# Patient Record
Sex: FEMALE | Race: WHITE | NOT HISPANIC OR LATINO | Employment: OTHER | URBAN - METROPOLITAN AREA
[De-identification: names, ages, dates, MRNs, and addresses within clinical notes are randomized per-mention and may not be internally consistent; named-entity substitution may affect disease eponyms.]

---

## 2017-10-11 ENCOUNTER — TRANSFERRED RECORDS (OUTPATIENT)
Dept: HEALTH INFORMATION MANAGEMENT | Facility: CLINIC | Age: 72
End: 2017-10-11

## 2020-11-17 ENCOUNTER — TRANSFERRED RECORDS (OUTPATIENT)
Dept: HEALTH INFORMATION MANAGEMENT | Facility: CLINIC | Age: 75
End: 2020-11-17

## 2021-08-24 ENCOUNTER — TRANSFERRED RECORDS (OUTPATIENT)
Dept: HEALTH INFORMATION MANAGEMENT | Facility: CLINIC | Age: 76
End: 2021-08-24

## 2022-07-22 ENCOUNTER — TRANSFERRED RECORDS (OUTPATIENT)
Dept: HEALTH INFORMATION MANAGEMENT | Facility: CLINIC | Age: 77
End: 2022-07-22

## 2022-08-05 ENCOUNTER — TRANSFERRED RECORDS (OUTPATIENT)
Dept: HEALTH INFORMATION MANAGEMENT | Facility: CLINIC | Age: 77
End: 2022-08-05

## 2022-10-31 ENCOUNTER — TRANSFERRED RECORDS (OUTPATIENT)
Dept: HEALTH INFORMATION MANAGEMENT | Facility: CLINIC | Age: 77
End: 2022-10-31

## 2023-03-03 ENCOUNTER — TRANSFERRED RECORDS (OUTPATIENT)
Dept: HEALTH INFORMATION MANAGEMENT | Facility: CLINIC | Age: 78
End: 2023-03-03

## 2023-08-02 ENCOUNTER — TRANSFERRED RECORDS (OUTPATIENT)
Dept: AUDIOLOGY | Facility: CLINIC | Age: 78
End: 2023-08-02

## 2023-12-11 ENCOUNTER — OFFICE VISIT (OUTPATIENT)
Dept: INTERNAL MEDICINE | Facility: CLINIC | Age: 78
End: 2023-12-11
Payer: MEDICARE

## 2023-12-11 ENCOUNTER — LAB (OUTPATIENT)
Dept: LAB | Facility: CLINIC | Age: 78
End: 2023-12-11
Payer: MEDICARE

## 2023-12-11 VITALS
OXYGEN SATURATION: 96 % | HEIGHT: 59 IN | SYSTOLIC BLOOD PRESSURE: 136 MMHG | DIASTOLIC BLOOD PRESSURE: 85 MMHG | BODY MASS INDEX: 29.57 KG/M2 | HEART RATE: 85 BPM | WEIGHT: 146.7 LBS

## 2023-12-11 DIAGNOSIS — Z23 NEED FOR TDAP VACCINATION: ICD-10-CM

## 2023-12-11 DIAGNOSIS — M85.852 OSTEOPENIA OF NECK OF LEFT FEMUR: ICD-10-CM

## 2023-12-11 DIAGNOSIS — Z23 NEED FOR PNEUMOCOCCAL VACCINE: ICD-10-CM

## 2023-12-11 DIAGNOSIS — L57.0 ACTINIC KERATOSIS: ICD-10-CM

## 2023-12-11 DIAGNOSIS — K21.9 GASTROESOPHAGEAL REFLUX DISEASE WITHOUT ESOPHAGITIS: ICD-10-CM

## 2023-12-11 DIAGNOSIS — E78.5 HYPERLIPIDEMIA, UNSPECIFIED HYPERLIPIDEMIA TYPE: ICD-10-CM

## 2023-12-11 DIAGNOSIS — M47.816 SPONDYLOSIS OF LUMBAR SPINE: Primary | ICD-10-CM

## 2023-12-11 DIAGNOSIS — J30.2 SEASONAL ALLERGIC RHINITIS, UNSPECIFIED TRIGGER: ICD-10-CM

## 2023-12-11 LAB
ALBUMIN SERPL BCG-MCNC: 4.3 G/DL (ref 3.5–5.2)
ALP SERPL-CCNC: 70 U/L (ref 40–150)
ALT SERPL W P-5'-P-CCNC: 16 U/L (ref 0–50)
ANION GAP SERPL CALCULATED.3IONS-SCNC: 9 MMOL/L (ref 7–15)
AST SERPL W P-5'-P-CCNC: 24 U/L (ref 0–45)
BILIRUB SERPL-MCNC: 0.2 MG/DL
BUN SERPL-MCNC: 13.9 MG/DL (ref 8–23)
CALCIUM SERPL-MCNC: 10 MG/DL (ref 8.8–10.2)
CHLORIDE SERPL-SCNC: 100 MMOL/L (ref 98–107)
CREAT SERPL-MCNC: 0.73 MG/DL (ref 0.51–0.95)
DEPRECATED HCO3 PLAS-SCNC: 26 MMOL/L (ref 22–29)
EGFRCR SERPLBLD CKD-EPI 2021: 84 ML/MIN/1.73M2
GLUCOSE SERPL-MCNC: 94 MG/DL (ref 70–99)
POTASSIUM SERPL-SCNC: 4.2 MMOL/L (ref 3.4–5.3)
PROT SERPL-MCNC: 7 G/DL (ref 6.4–8.3)
SODIUM SERPL-SCNC: 135 MMOL/L (ref 135–145)

## 2023-12-11 PROCEDURE — 80053 COMPREHEN METABOLIC PANEL: CPT | Performed by: PATHOLOGY

## 2023-12-11 PROCEDURE — G0439 PPPS, SUBSEQ VISIT: HCPCS | Performed by: HOSPITALIST

## 2023-12-11 PROCEDURE — 36415 COLL VENOUS BLD VENIPUNCTURE: CPT | Performed by: PATHOLOGY

## 2023-12-11 RX ORDER — MULTIPLE VITAMINS W/ MINERALS TAB 9MG-400MCG
1 TAB ORAL DAILY
Qty: 90 TABLET | Refills: 3 | Status: SHIPPED | OUTPATIENT
Start: 2023-12-11

## 2023-12-11 RX ORDER — MULTIVIT-MIN/IRON/FOLIC ACID/K 18-600-40
500 CAPSULE ORAL DAILY
Qty: 90 CAPSULE | Refills: 3 | Status: SHIPPED | OUTPATIENT
Start: 2023-12-11

## 2023-12-11 RX ORDER — MELOXICAM 7.5 MG/1
TABLET ORAL
COMMUNITY
End: 2023-12-11

## 2023-12-11 RX ORDER — ALENDRONATE SODIUM 70 MG/1
70 TABLET ORAL
Qty: 12 TABLET | Refills: 3 | Status: SHIPPED | OUTPATIENT
Start: 2023-12-11 | End: 2024-06-10

## 2023-12-11 RX ORDER — FLUTICASONE PROPIONATE 50 MCG
1 SPRAY, SUSPENSION (ML) NASAL DAILY PRN
Qty: 11.1 ML | Refills: 2 | Status: SHIPPED | OUTPATIENT
Start: 2023-12-11

## 2023-12-11 RX ORDER — MELOXICAM 7.5 MG/1
7.5 TABLET ORAL DAILY PRN
Qty: 90 TABLET | Refills: 1 | Status: SHIPPED | OUTPATIENT
Start: 2023-12-11 | End: 2024-07-04

## 2023-12-11 RX ORDER — MULTIVIT-MIN/IRON/FOLIC ACID/K 18-600-40
500 CAPSULE ORAL DAILY
COMMUNITY
End: 2023-12-11

## 2023-12-11 RX ORDER — CHLORAL HYDRATE 500 MG
2 CAPSULE ORAL DAILY
Qty: 180 CAPSULE | Refills: 3 | Status: SHIPPED | OUTPATIENT
Start: 2023-12-11

## 2023-12-11 RX ORDER — SIMVASTATIN 20 MG
20 TABLET ORAL AT BEDTIME
COMMUNITY
End: 2023-12-11

## 2023-12-11 RX ORDER — VIT B COMP NO.3/FOLIC/C/BIOTIN 1 MG-60 MG
1 TABLET ORAL DAILY
COMMUNITY
End: 2023-12-11

## 2023-12-11 RX ORDER — MULTIPLE VITAMINS W/ MINERALS TAB 9MG-400MCG
1 TAB ORAL DAILY
COMMUNITY
End: 2023-12-11

## 2023-12-11 RX ORDER — FLUTICASONE PROPIONATE 50 MCG
SPRAY, SUSPENSION (ML) NASAL
COMMUNITY
End: 2023-12-11

## 2023-12-11 RX ORDER — SIMVASTATIN 20 MG
20 TABLET ORAL AT BEDTIME
Qty: 90 TABLET | Refills: 3 | Status: SHIPPED | OUTPATIENT
Start: 2023-12-11

## 2023-12-11 RX ORDER — OMEPRAZOLE 40 MG/1
40 CAPSULE, DELAYED RELEASE ORAL DAILY
Qty: 90 CAPSULE | Refills: 3 | Status: SHIPPED | OUTPATIENT
Start: 2023-12-11 | End: 2024-09-24

## 2023-12-11 RX ORDER — RESPIRATORY SYNCYTIAL VIRUS VACCINE 120MCG/0.5
0.5 KIT INTRAMUSCULAR ONCE
Qty: 1 EACH | Refills: 0 | Status: CANCELLED | OUTPATIENT
Start: 2023-12-11 | End: 2023-12-11

## 2023-12-11 RX ORDER — CHLORAL HYDRATE 500 MG
2 CAPSULE ORAL DAILY
COMMUNITY
End: 2023-12-11

## 2023-12-11 RX ORDER — ALENDRONATE SODIUM 70 MG/1
TABLET ORAL
COMMUNITY
End: 2023-12-11

## 2023-12-11 RX ORDER — OMEPRAZOLE 40 MG/1
40 CAPSULE, DELAYED RELEASE ORAL DAILY
COMMUNITY
End: 2023-12-11

## 2023-12-11 ASSESSMENT — ENCOUNTER SYMPTOMS
WEAKNESS: 1
FREQUENCY: 0
DIARRHEA: 0
DYSURIA: 0
SHORTNESS OF BREATH: 0
ABDOMINAL PAIN: 0
NUMBNESS: 0
BACK PAIN: 1
FEVER: 0
CONSTIPATION: 0
CHILLS: 0

## 2023-12-11 NOTE — PATIENT INSTRUCTIONS
- Referral to spine.   - Referral to dermatology.   - Keep audiology referral.   - Obtain CMP labs.   - Give Prevnar 20 vaccine today.   - Recommend Tdap (tetanus with diphtheria and pertussis) vaccine at the pharmacy.     Follow up again in 3 months.

## 2023-12-11 NOTE — ASSESSMENT & PLAN NOTE
Last MRI lumbar spine with note of moderate spondylosis changes in 2021. Does have weakness in legs, more on left with knee flexion. Has received 2 epidural steroid injections this year in February and August.   - Will refer to spine.   - Continued on mobic 7.5mg daily.

## 2023-12-11 NOTE — PROGRESS NOTES
Assessment/Plan  Problem List Items Addressed This Visit       Need for Tdap vaccination     - Giving Prevnar 20 today. Recommend Tdap vaccine at the pharmacy.          Hyperlipidemia, unspecified hyperlipidemia type     Last lipid panel was on 6/1/23. LDL was 97.   - Continued on simvastatin 20mg daily.          Relevant Medications    simvastatin (ZOCOR) 20 MG tablet    Other Relevant Orders    Comprehensive metabolic panel (BMP + Alb, Alk Phos, ALT, AST, Total. Bili, TP) (Completed)    Actinic keratosis     - Will place a referral to dermatology for continued skin checks. Was getting skin checks every 6 months in [place].          Relevant Orders    Adult Dermatology  Referral    Spondylosis of lumbar spine - Primary     Last MRI lumbar spine with note of moderate spondylosis changes in 2021. Does have weakness in legs, more on left with knee flexion. Has received 2 epidural steroid injections this year in February and August.   - Will refer to spine.   - Continued on mobic 7.5mg daily.          Relevant Medications    alendronate (FOSAMAX) 70 MG tablet    meloxicam (MOBIC) 7.5 MG tablet    calcium citrate-vitamin D (CITRACAL) 200-6.25 MG-MCG TABS per tablet    cholecalciferol (VITAMIN D3) 25 mcg (1000 units) capsule    Other Relevant Orders    Spine  Referral    Osteopenia of neck of left femur     Last DEXA scan was in 2020, was recommended to repeat in 2 years.   - Will discuss at next visit.   - Continued on calcium and vitamin D.   - Has been on alendronate 70mg weekly.          Relevant Medications    alendronate (FOSAMAX) 70 MG tablet    meloxicam (MOBIC) 7.5 MG tablet    calcium citrate-vitamin D (CITRACAL) 200-6.25 MG-MCG TABS per tablet    cholecalciferol (VITAMIN D3) 25 mcg (1000 units) capsule       No results found for any visits on 12/11/23.    Health Maintenance Due   Topic Date Due    DEXA  Never done    ANNUAL REVIEW OF HM ORDERS  Never done    ADVANCE CARE PLANNING  Never  done    HEPATITIS C SCREENING  Never done    LIPID  Never done    RSV VACCINE (Pregnancy & 60+) (1 - 1-dose 60+ series) Never done    MEDICARE ANNUAL WELLNESS VISIT  Never done    FALL RISK ASSESSMENT  Never done    Pneumococcal Vaccine: 65+ Years (2 - PPSV23 or PCV20) 02/02/2016    PHQ-2 (once per calendar year)  Never done    DTAP/TDAP/TD IMMUNIZATION (2 - Td or Tdap) 02/04/2023         Subjective  Patient is here to establish care. Patient mentions she is living at [facility] with . Mentions she is recovering from COVID19 infection now about 11 days, improving but still tired. Moved to [place] about 6 weeks ago.     Was seen at  in [place]. Had a referral to neurosurgery and she had 2 steroid injections to her back (had 2 in February and August). Injections did help with back. Since August, pains are slowly getting worse again. Has some weakness in her left leg. No numbness in her legs. No bowel or urinary issues. Mentions back pains are along her lower back and pains to both the back of her upper thighs. Mentions she uses a cane. Feel in [place] years ago, not in the past year. Just bought a walker a 4 wheeled walker with seat and breaks. She can take stairs but has to hang on to railing and moves slow. At her assisted living and does have exercise programs at apartment. With recent COVID19 infection, had to stay in and avoided exercises for at least the first 5 days and slowly increased going out with use of mask.     Mentions had 1 five year oral bone medication. Now on 2nd five year course.     Mentions she had right wrist fracture about 9 years ago after a fall.     Has an appointment for audiology for hearing aids.     Patient did bring in copies of her prior health records, including MRI lumbar spine in 2021, and recent labs.     Last lipids done on 6/1/23, Total cholesterol 175, LDL 97, HDL 62, Triglycerides 82. Creatinine was 0.80 on 11/29/22. WBC 4.6, Hb 12.9, platelet 212 on 8/5/22.  Hepatitis C antibody negative in .     MRI lumbar spine on 21:  Impression:  1. Moderate right convex rotoscoliosis of lumbar spine identified with associated degenerative disease.   2. Moderate spondylotic changes identified throughout lumbar spine, moderate thecal sac narrowing noted at L4-5.     DEXA scan on 20:  Note of T-score at negative 1.5 along left femoral neck bone consistent with osteopenia. Recommended to repeat bone DEXA scan in 2 years.     Patient also brings in Power of  for Health Care (completed on 3/18/2023) with designation of  Deloris Lyman, son Kenrick Lyman and daughter Theodora Lyman to serve together as her healthcare agents.                 Review of Systems   Constitutional:  Negative for chills and fever.   Respiratory:  Negative for shortness of breath.    Cardiovascular:  Negative for chest pain.   Gastrointestinal:  Negative for abdominal pain, constipation and diarrhea.   Genitourinary:  Negative for dysuria and frequency.   Musculoskeletal:  Positive for back pain.   Skin:  Negative for rash.   Neurological:  Positive for weakness. Negative for numbness.   Psychiatric/Behavioral:  Negative for mood changes.        History  Past Medical History:   Diagnosis Date    Actinic keratosis     Gastroesophageal reflux disease without esophagitis     HLD (hyperlipidemia)     Spondylosis of lumbosacral spine with radiculopathy        Past Surgical History:   Procedure Laterality Date    APPENDECTOMY       SECTION      x3    IR LUMBAR EPIDURAL STEROID INJECTION Bilateral 2023    L4-5    IR LUMBAR EPIDURAL STEROID INJECTION Bilateral 2023    L4-5    TONSILLECTOMY      XR WRIST SURGERY JEFF RIGHT      with pins       Family History   Problem Relation Age of Onset    Heart Failure Mother     Ovarian Cancer Mother     Ulcers Father     Rheumatoid Arthritis Father     Myocardial Infarction Father     Breast Cancer Maternal Grandmother        Social History  "    Tobacco Use    Smoking status: Never    Smokeless tobacco: Never   Substance Use Topics    Alcohol use: Yes     Comment: about 3 drinks a month        Objective  BP (!) 157/88 (BP Location: Right arm, Patient Position: Sitting, Cuff Size: Adult Regular)   Pulse 95   Ht 1.486 m (4' 10.5\")   Wt 66.5 kg (146 lb 11.2 oz)   SpO2 97%   BMI 30.13 kg/m    Vitals taken by Fredi Gutierrez MD    Physical Exam  Constitutional:       General: She is not in acute distress.     Appearance: She is not ill-appearing or toxic-appearing.   HENT:      Head: Normocephalic.   Eyes:      Conjunctiva/sclera: Conjunctivae normal.   Cardiovascular:      Rate and Rhythm: Regular rhythm.      Heart sounds: Normal heart sounds. No murmur heard.     No friction rub. No gallop.   Pulmonary:      Effort: Pulmonary effort is normal. No respiratory distress.      Breath sounds: Normal breath sounds. No wheezing, rhonchi or rales.   Musculoskeletal:      Right lower leg: No edema.      Left lower leg: No edema.      Comments: 5/5 strength bilateral hip flexion. 5/5 strength right knee extension, 4/5 right knee flexion. 4/5 strength left knee extension, 3/5 left knee flexion.    Neurological:      Mental Status: She is alert.   Psychiatric:         Mood and Affect: Mood normal.         Thought Content: Thought content normal.         35 minutes spent on the date of the encounter doing chart review, history and exam, documentation and further activities per the note.      Return in about 3 months (around 3/11/2024).        Fredi Gutierrez MD  [facility] [place]    "

## 2023-12-11 NOTE — PROGRESS NOTES
Medicare Annual Wellness Questionnaire:  This 78 year old year old female presents for a Medicare Wellness Exam.    Fall Risk Assessment:  Have you fallen 2 or more times in the last year (not including slipping on ice)?  No    Any fall with injury in the past year?  No    PHQ-2:  Over the last 2 weeks, how often have you had little interest or pleasure in doing things?  Not at all (0)     Over the last 2 weeks, how often have you been bothered by feeling down, depressed, or hopeless?  Not at all (0)     Social History:  What is your marital status?      Who lives in your household?   and Self     Does you have loose rugs in your household?   No    Does you have grab bars in your bathroom?  Yes     Does you have handrails by the stairs?  Yes     Does your home have poorly lit areas?    No    Do you feel threatened or controlled by a partner, ex-partner or anyone in your life?   No    Has anyone hurt you physically (for example by pushing, hitting, slapping or kicking you or forcing you to have sex?   No    Do you need help with the phone, transportation, shopping, preparing meals, housework, laundry, medications or managing finances?   No    Sexual Health:  Are you sexually active?    No    If yes, with men, women, or both?   Men Women Both    If yes, how many partners?      If yes, are you using condoms?        Have you had any sexually transmitted infections in the last year?   No    Do you have any sexual concerns?    No    Women Only:  Women: What year did you stop having periods (approximate age)?  56 years old     Women: Any vaginal bleeding in the last year?    No    Women: Have you ever had an abnormal Pap smear?    No    General Health Assessment:  Have you noticed any hearing difficulties?   Pt did not answer.     Do you wear hearing aids?   No    Have you seen a hearing professional such as an audiologist in the last 1 year?   Yes     Do you have vision difficulty?    No    Do you wear  glasses or contacts?   Yes     Have you seen an eye doctor in the last 1 year?   Yes     How many servings of fruits and vegetables do you eat a day? (serving = 1 cup)  Fruit: 2  Vegetables: 4    How often do you exercise in a week?  4-6    How long and what kind of exercise do you do?  Senior fitness class    Tobacco and Alcohol History:  Do you use tobacco/nicotine products?    No    If yes, please list the method of use and average weekly consumption?      Do you use any other drugs?   No         Do you drink alcohol?   Yes     If you drink alcohol, how many drinks per week?  4 a month (very rare)    Advance Directive:  Have you completed an Advance Directives document?  Yes     If yes, have you given a copy to the clinic?   Yes     Do you need information on Advance Directives?   No    DEX Irby at 9:21 AM on 12/11/2023Damber is a 78 year old that presents in clinic today for the following:     Chief Complaint   Patient presents with    Establish Care Medicare Visit     Back pain, referral for a spine clinic.            12/11/2023     7:48 AM   Additional Questions   Roomed by PAIGE   Accompanied by Son, Neno       Dalia from encounters over the past 10 days    No data recorded       DEX Irby at 7:54 AM on 12/11/2023

## 2023-12-11 NOTE — ASSESSMENT & PLAN NOTE
- Will place a referral to dermatology for continued skin checks. Was getting skin checks every 6 months in [place].

## 2023-12-11 NOTE — ASSESSMENT & PLAN NOTE
Last DEXA scan was in 2020, was recommended to repeat in 2 years.   - Will discuss at next visit.   - Continued on calcium and vitamin D.   - Has been on alendronate 70mg weekly.

## 2023-12-21 ENCOUNTER — DOCUMENTATION ONLY (OUTPATIENT)
Dept: OTHER | Facility: CLINIC | Age: 78
End: 2023-12-21
Payer: MEDICARE

## 2023-12-31 ENCOUNTER — HEALTH MAINTENANCE LETTER (OUTPATIENT)
Age: 78
End: 2023-12-31

## 2024-01-10 ENCOUNTER — TELEPHONE (OUTPATIENT)
Dept: ANESTHESIOLOGY | Facility: CLINIC | Age: 79
End: 2024-01-10
Payer: MEDICARE

## 2024-01-10 ASSESSMENT — ANXIETY QUESTIONNAIRES
3. WORRYING TOO MUCH ABOUT DIFFERENT THINGS: NOT AT ALL
GAD7 TOTAL SCORE: 0
6. BECOMING EASILY ANNOYED OR IRRITABLE: NOT AT ALL
2. NOT BEING ABLE TO STOP OR CONTROL WORRYING: NOT AT ALL
7. FEELING AFRAID AS IF SOMETHING AWFUL MIGHT HAPPEN: NOT AT ALL
4. TROUBLE RELAXING: NOT AT ALL
1. FEELING NERVOUS, ANXIOUS, OR ON EDGE: NOT AT ALL
7. FEELING AFRAID AS IF SOMETHING AWFUL MIGHT HAPPEN: NOT AT ALL
8. IF YOU CHECKED OFF ANY PROBLEMS, HOW DIFFICULT HAVE THESE MADE IT FOR YOU TO DO YOUR WORK, TAKE CARE OF THINGS AT HOME, OR GET ALONG WITH OTHER PEOPLE?: NOT DIFFICULT AT ALL
GAD7 TOTAL SCORE: 0
5. BEING SO RESTLESS THAT IT IS HARD TO SIT STILL: NOT AT ALL
IF YOU CHECKED OFF ANY PROBLEMS ON THIS QUESTIONNAIRE, HOW DIFFICULT HAVE THESE PROBLEMS MADE IT FOR YOU TO DO YOUR WORK, TAKE CARE OF THINGS AT HOME, OR GET ALONG WITH OTHER PEOPLE: NOT DIFFICULT AT ALL

## 2024-01-10 ASSESSMENT — PAIN SCALES - PAIN ENJOYMENT GENERAL ACTIVITY SCALE (PEG)
AVG_PAIN_PASTWEEK: 3
PEG_TOTALSCORE: 3.67
INTERFERED_ENJOYMENT_LIFE: 4
AVG_PAIN_PASTWEEK: 3
INTERFERED_GENERAL_ACTIVITY: 4
INTERFERED_GENERAL_ACTIVITY: 4
INTERFERED_ENJOYMENT_LIFE: 4
PEG_TOTALSCORE: 3.67

## 2024-01-10 NOTE — TELEPHONE ENCOUNTER
I called and spoke with the patient  and her they are aware of her appointment tomorrow with Dr rojas at 9:00 to arrive 15-20 minutes prior

## 2024-01-11 ENCOUNTER — OFFICE VISIT (OUTPATIENT)
Dept: ANESTHESIOLOGY | Facility: CLINIC | Age: 79
End: 2024-01-11
Payer: MEDICARE

## 2024-01-11 VITALS — OXYGEN SATURATION: 99 % | SYSTOLIC BLOOD PRESSURE: 173 MMHG | HEART RATE: 81 BPM | DIASTOLIC BLOOD PRESSURE: 93 MMHG

## 2024-01-11 DIAGNOSIS — M54.16 LUMBAR RADICULOPATHY, CHRONIC: Primary | ICD-10-CM

## 2024-01-11 DIAGNOSIS — M47.816 SPONDYLOSIS OF LUMBAR SPINE: ICD-10-CM

## 2024-01-11 DIAGNOSIS — M48.061 SPINAL STENOSIS AT L4-L5 LEVEL: ICD-10-CM

## 2024-01-11 PROCEDURE — 99204 OFFICE O/P NEW MOD 45 MIN: CPT | Performed by: ANESTHESIOLOGY

## 2024-01-11 RX ORDER — METHOCARBAMOL 500 MG/1
500 TABLET, FILM COATED ORAL 4 TIMES DAILY
Qty: 30 TABLET | Refills: 0 | Status: SHIPPED | OUTPATIENT
Start: 2024-01-11 | End: 2024-03-11

## 2024-01-11 ASSESSMENT — ENCOUNTER SYMPTOMS
BACK PAIN: 1
MYALGIAS: 1
DIZZINESS: 1
FOCAL WEAKNESS: 1
NERVOUS/ANXIOUS: 0
DEPRESSION: 0
CONSTITUTIONAL NEGATIVE: 1
RESPIRATORY NEGATIVE: 1
FALLS: 1
HEARTBURN: 1
EYES NEGATIVE: 1
CARDIOVASCULAR NEGATIVE: 1
SENSORY CHANGE: 1
DIAPHORESIS: 0

## 2024-01-11 ASSESSMENT — PAIN SCALES - GENERAL: PAINLEVEL: MILD PAIN (2)

## 2024-01-11 NOTE — PROGRESS NOTES
[facility] : Consultation Note    Patient: Ericka Lyman Age: 78 year old   MRN: [id number] Referred by:  Matt     Date of Visit: January 11, 2024    Reason for consultation:    Ericka Lyman is a 78 year old female who is seen in consultation today at the request of her provider,Dr. Gutierrez for a comprehensive evaluation and management of pain.  Primary Care Provider is Fredi Gutierrez.      Chief complaints:    Chief Complaint   Patient presents with    Consult     Spondylosis          History of Present illness:     Ericka Lyman is a 78 year old female with pain history as described below:       What number best describes your pain right now: 3  (0 = No pain to 10 = Worst pain imaginable)    How would you describe the pain? throbbing    Which of the following worsen your pain? walking, exercise    Which of the following improve or reduce your pain? medication    What number best describes your average pain for the past week: 3  (0 = No pain to 10 = Worst pain imaginable)    What number best describes your LOWEST pain in past 24 hours: 2  (0 = No pain to 10 = Worst pain imaginable)    What number best describes your WORST pain in past 24 hours: 5  (0 = No pain to 10 = Worst pain imaginable)    When is your pain worst? Constant    What non-medicine treatments have you already had for your pain? physical therapy, chiropractic care, TENS (electrical stimulator), spine injections (shots), exercise    Have you tried treating your pain with medication? Yes    If yes, please answer the below questions -     What topical medications have you tried in the past but are no longer taking? Other gels, creams, patches or ointments    What anti-convulsants (seizure medicines) have you tried in the past but are no longer taking? None    What anti-depressant medication have you tried in the past but are no longer taking? None    What sleep aid  medications have you tried in the past but are no longer taking? None    What opioid medications have you tried in the past but are no longer taking? None    What NSAID medications have you tried in the past but are no longer taking? Ibuprofen (Advil, Motrin)    What muscle relaxer medications have you tried in the past but are no longer taking? None    What anti-migraine medications have you tried in the past but are no longer taking? None        During the past month, list all the medications that you have used for pain. Please list drug name, dose, and frequency taking: Meloxicam twice a day; Tylenol    The patient has a medical history significant for spondylosis of the lumbar spine, lower back pain, lumbar radiculopathy, osteopenia, and hyperlipidemia.  She developed back pain and had 4 steroid injections ( L4-L5 transforaminal epidural steroid injection) in her back in February and August.  Injection did help her pain.    Pain never goes below knee.  Pain bilateral (left greater than right)  Since August 2023 pain are slowly getting worse.  She developed some weakness in the left leg but no bowel or bladder incontinence.  She walks with a cane.  Her MRI of the lumbosacral spine reviewed.  Patient has pronounced degenerative changes in the lumbar and lower thoracic spine including loss of disc dehydration, disc bulging osteophyte formation at the disc and hypertrophic arthritic changes at the facet joints.  She also has ligamentum flavum thickening.  There is spinal stenosis pronounced at the L4-L5.  And grade 1 spondylolisthesis at L5-S1.  70% of his her pain is back and 30% the leg.      Trials of therapies  including     PT: No  TENs Unit: No  Manual Medicine/Chiropractic: No  Surgeries:No  Acupuncture: No  Other Integrative therapies: No  Behavioral interventions: No  Previous medication treatments included: meloxi  Previous pain interventions: epdiure    [place] Prescription Monitoring Program:    Reviewed. No concerns    Review of Systems:  Review of Systems   Constitutional: Negative.  Negative for diaphoresis.   HENT: Negative.     Eyes: Negative.    Respiratory: Negative.     Cardiovascular: Negative.    Gastrointestinal:  Positive for heartburn.   Genitourinary: Negative.    Musculoskeletal:  Positive for back pain, falls, joint pain and myalgias.   Skin: Negative.    Neurological:  Positive for dizziness, sensory change and focal weakness.   Psychiatric/Behavioral:  Negative for depression. The patient is not nervous/anxious.        Patient Supplied Answers To the  Pain Questionnaire      1/10/2024    10:28 AM    Pain -  Patient Entered Questionnaire/Answers   What number best describes your pain right now:  0 = No pain  to  10 = Worst pain imaginable 3   How would you describe the pain throbbing   Which of the following worsen your pain walking    exercise   Which of the following improve or reduce your pain medication   What number best describes your average pain for the past week:  0 = No pain  to  10 = Worst pain imaginable 3   What number best describes your LOWEST pain in past 24 hours:  0 = No pain  to  10 = Worst pain imaginable 2   What number best describes your WORST pain in past 24 hours:  0 = No pain  to  10 = Worst pain imaginable 5   When is your pain worst Constant   What non-medicine treatments have you already had for your pain physical therapy    chiropractic care    TENS (electrical stimulator)    spine injections (shots)    exercise                1/10/2024    10:12 AM   MICHEAL-7 SCORE   Total Score 0 (minimal anxiety)   Total Score 0            1/11/2024     8:48 AM   PHQ-2 ( 1999 [employer])   Q1: Little interest or pleasure in doing things 0   Q2: Feeling down, depressed or hopeless 0   PHQ-2 Score 0             No data to display                   Past Medical History:  Past Medical History:   Diagnosis Date    Actinic keratosis     Gastroesophageal reflux disease without  esophagitis     HLD (hyperlipidemia)     Spondylosis of lumbosacral spine with radiculopathy        Past Surgical History:  Past Surgical History:   Procedure Laterality Date    APPENDECTOMY       SECTION      x3    IR LUMBAR EPIDURAL STEROID INJECTION Bilateral 2023    L4-5    IR LUMBAR EPIDURAL STEROID INJECTION Bilateral 2023    L4-5    TONSILLECTOMY      XR WRIST SURGERY JEFF RIGHT      with pins       Medications:  Current Outpatient Medications   Medication Sig Dispense Refill    alendronate (FOSAMAX) 70 MG tablet Take 1 tablet (70 mg) by mouth every 7 days 12 tablet 3    Ascorbic Acid (VITAMIN C) 500 MG CAPS Take 500 mg by mouth daily 90 capsule 3    calcium citrate-vitamin D (CITRACAL) 200-6.25 MG-MCG TABS per tablet Take 1 tablet by mouth 4 times daily Calcium Citrate at 1000 MG Total - 77%  Vitamin D at 2000 international unit(s) - 250%      cholecalciferol (VITAMIN D3) 25 mcg (1000 units) capsule Take 1 capsule (25 mcg) by mouth daily 90 capsule 3    fish oil-omega-3 fatty acids 1000 MG capsule Take 2 capsules (2 g) by mouth daily 180 capsule 3    fluticasone (FLONASE) 50 MCG/ACT nasal spray Spray 1 spray into both nostrils daily as needed for rhinitis or allergies 11.1 mL 2    meloxicam (MOBIC) 7.5 MG tablet Take 1 tablet (7.5 mg) by mouth daily as needed for moderate pain 90 tablet 1    multivitamin w/minerals (THERA-VIT-M) tablet Take 1 tablet by mouth daily Generic 90 tablet 3    omeprazole (PRILOSEC) 40 MG DR capsule Take 1 capsule (40 mg) by mouth daily 90 capsule 3    simvastatin (ZOCOR) 20 MG tablet Take 1 tablet (20 mg) by mouth at bedtime 90 tablet 3         Medications related to Pain Management:   Medications related to Pain Management (From now, onward)      None            Allergies:       Allergies   Allergen Reactions    Augmentin [Amoxicillin-Pot Clavulanate] Diarrhea    Penicillins Itching       Social History:    Social History     Socioeconomic History    Marital  status:      Spouse name: Not on file    Number of children: Not on file    Years of education: Not on file    Highest education level: Not on file   Occupational History    Not on file   Tobacco Use    Smoking status: Never    Smokeless tobacco: Never   Substance and Sexual Activity    Alcohol use: Yes     Comment: about 3 drinks a month    Drug use: Never    Sexual activity: Not on file   Other Topics Concern    Not on file   Social History Narrative    Not on file     Social Determinants of Health     Financial Resource Strain: Low Risk  (12/4/2023)    Financial Resource Strain     Within the past 12 months, have you or your family members you live with been unable to get utilities (heat, electricity) when it was really needed?: No   Food Insecurity: Low Risk  (12/4/2023)    Food Insecurity     Within the past 12 months, did you worry that your food would run out before you got money to buy more?: No     Within the past 12 months, did the food you bought just not last and you didn t have money to get more?: No   Transportation Needs: Low Risk  (12/4/2023)    Transportation Needs     Within the past 12 months, has lack of transportation kept you from medical appointments, getting your medicines, non-medical meetings or appointments, work, or from getting things that you need?: No   Physical Activity: Not on file   Stress: Not on file   Social Connections: Not on file   Interpersonal Safety: Not on file   Housing Stability: Low Risk  (12/4/2023)    Housing Stability     Do you have housing? : Yes     Are you worried about losing your housing?: No     Social History     Social History Narrative    Not on file         Family history:  Family History   Problem Relation Age of Onset    Heart Failure Mother     Ovarian Cancer Mother     Ulcers Father     Rheumatoid Arthritis Father     Myocardial Infarction Father     Breast Cancer Maternal Grandmother          Physical Exam:  Vitals:    01/11/24 0848   BP: (!)  "173/93   BP Location: Right arm   Patient Position: Chair   Cuff Size: Adult Large   Pulse: 81   SpO2: 99%       General: Awake in no apparent distress.   Eyes: Sclerae are anicteric. PERRLA, EOMI   Neck: supple, no masses.   Lungs: unlabored.   Heart: regular rate and rhythm   Abdomen: soft non tender.  Extremities: Pulses are well palpable, no peripheral edema.   Musculoskeletal: 4 out of 5 muscle strength of the left lower extremity, 5 out of 5 muscle strength in distal extremities.  Straight leg test is positive on the right.  There is midline and paraspinal tenderness approximately L4-S1.  The patient changes position without pain behavior. The patient walks with a normal gait. Posture is normal.   Neurologic exam: Sensation to light touch intact throughout all dermatomes bilateral upper extremities and lower extremities  Psychiatric; Normal affect.   Skin: Warm and Dry.       LABORATORY VALUES:   Recent Labs   Lab Test 12/11/23  0904      POTASSIUM 4.2   CHLORIDE 100   CO2 26   ANIONGAP 9   GLC 94   BUN 13.9   CR 0.73   THAIS 10.0       CBC RESULTS: No results for input(s): \"WBC\", \"RBC\", \"HGB\", \"HCT\", \"MCV\", \"MCH\", \"MCHC\", \"RDW\", \"PLT\" in the last 91044 hours.    Most Recent 3 INR's:No lab results found.           ASSESSMENT/PLAN:                             ASSESSMENT:    Diagnoses         Codes Comments    Lumbar radiculopathy, chronic    -  Primary M54.16     Spondylosis of lumbar spine     M47.816     Spinal stenosis at L4-L5 level     M48.061              PLAN:    - Medications.     Meloxicam 7.5 mg daily  500 mg 4 times daily as needed    - Interventional procedures:  Order lumbar epidural steroid injection.  Risk of the procedure including bleeding, infection, failure procedure discussed with the patient.    - Labs and imaging: Will obtain lumbar MRI images from the outside medical center    - Rehab: The patient is also encouraged to stay active as tolerated.    PT referral for core-strengthening " exercises to relieve back pain include the pelvic tilt, cat-cow pose, bird dog, high and low planks, crunches, and exercises performed using a Swiss ball (or exercise ball).       - Psychology: No current needs.    - Integrated medicine: ordered acupuncture therapy.    - Disposition:   We will see the patient for above mentioned procedure.       Assessment will be ongoing with changes in treatment as indicated.  Benefits/risks/alternatives to treatment have been reviewed and the patient has been instructed to contact this office if they have any questions or concerns.  This plan of care has been discussed with the patient and the patient is in agreement.     Omid Escobar MD, PHD

## 2024-01-11 NOTE — NURSING NOTE
RN reviewed AVS with patient. Patient to contact clinic if any questions/concerns. Patient verbalized understanding.    Elvie Ho RN

## 2024-01-11 NOTE — NURSING NOTE
Patient presents with:  Consult: Spondylosis       Mild Pain (2)         What medications are you using for pain? Meloxicam    (New patients only) Have you been seen by another pain clinic/ provider? Yes, [facility]     (Return Patients only) What refills are you needing today? no    Expectations: marsha Morse, EMT

## 2024-01-11 NOTE — LETTER
1/11/2024       RE: Ericka Lyman  [address]  [facility] [address]       Dear Colleague,    Thank you for referring your patient, Ericka Lyman, to the [facility] FOR COMPREHENSIVE PAIN MANAGEMENT [place] at [facility]. Please see a copy of my visit note below.    [facility] for Comprehensive Chronic Pain Management : Consultation Note    Patient: Ericka Lyman Age: 78 year old   MRN: [id number] Referred by:  Matt     Date of Visit: January 11, 2024    Reason for consultation:    Ericka Lyman is a 78 year old female who is seen in consultation today at the request of her provider,Dr. Gutierrez for a comprehensive evaluation and management of pain.  Primary Care Provider is Fredi Gutierrez.      Chief complaints:    Chief Complaint   Patient presents with    Consult     Spondylosis          History of Present illness:     Ericka Lyman is a 78 year old female with pain history as described below:       What number best describes your pain right now: 3  (0 = No pain to 10 = Worst pain imaginable)    How would you describe the pain? throbbing    Which of the following worsen your pain? walking, exercise    Which of the following improve or reduce your pain? medication    What number best describes your average pain for the past week: 3  (0 = No pain to 10 = Worst pain imaginable)    What number best describes your LOWEST pain in past 24 hours: 2  (0 = No pain to 10 = Worst pain imaginable)    What number best describes your WORST pain in past 24 hours: 5  (0 = No pain to 10 = Worst pain imaginable)    When is your pain worst? Constant    What non-medicine treatments have you already had for your pain? physical therapy, chiropractic care, TENS (electrical stimulator), spine injections (shots), exercise    Have you tried treating your pain with medication? Yes    If yes, please answer the below questions -      What topical medications have you tried in the past but are no longer taking? Other gels, creams, patches or ointments    What anti-convulsants (seizure medicines) have you tried in the past but are no longer taking? None    What anti-depressant medication have you tried in the past but are no longer taking? None    What sleep aid medications have you tried in the past but are no longer taking? None    What opioid medications have you tried in the past but are no longer taking? None    What NSAID medications have you tried in the past but are no longer taking? Ibuprofen (Advil, Motrin)    What muscle relaxer medications have you tried in the past but are no longer taking? None    What anti-migraine medications have you tried in the past but are no longer taking? None        During the past month, list all the medications that you have used for pain. Please list drug name, dose, and frequency taking: Meloxicam twice a day; Tylenol    The patient has a medical history significant for spondylosis of the lumbar spine, lower back pain, lumbar radiculopathy, osteopenia, and hyperlipidemia.  She developed back pain and had 4 steroid injections ( L4-L5 transforaminal epidural steroid injection) in her back in February and August.  Injection did help her pain.    Pain never goes below knee.  Pain bilateral (left greater than right)  Since August 2023 pain are slowly getting worse.  She developed some weakness in the left leg but no bowel or bladder incontinence.  She walks with a cane.  Her MRI of the lumbosacral spine reviewed.  Patient has pronounced degenerative changes in the lumbar and lower thoracic spine including loss of disc dehydration, disc bulging osteophyte formation at the disc and hypertrophic arthritic changes at the facet joints.  She also has ligamentum flavum thickening.  There is spinal stenosis pronounced at the L4-L5.  And grade 1 spondylolisthesis at L5-S1.  70% of his her pain is back and 30% the  leg.      Trials of therapies  including     PT: No  TENs Unit: No  Manual Medicine/Chiropractic: No  Surgeries:No  Acupuncture: No  Other Integrative therapies: No  Behavioral interventions: No  Previous medication treatments included: meloxi  Previous pain interventions: epdiure    [place] Prescription Monitoring Program:   Reviewed. No concerns    Review of Systems:  Review of Systems   Constitutional: Negative.  Negative for diaphoresis.   HENT: Negative.     Eyes: Negative.    Respiratory: Negative.     Cardiovascular: Negative.    Gastrointestinal:  Positive for heartburn.   Genitourinary: Negative.    Musculoskeletal:  Positive for back pain, falls, joint pain and myalgias.   Skin: Negative.    Neurological:  Positive for dizziness, sensory change and focal weakness.   Psychiatric/Behavioral:  Negative for depression. The patient is not nervous/anxious.        Patient Supplied Answers To the  Pain Questionnaire      1/10/2024    10:28 AM    Pain -  Patient Entered Questionnaire/Answers   What number best describes your pain right now:  0 = No pain  to  10 = Worst pain imaginable 3   How would you describe the pain throbbing   Which of the following worsen your pain walking    exercise   Which of the following improve or reduce your pain medication   What number best describes your average pain for the past week:  0 = No pain  to  10 = Worst pain imaginable 3   What number best describes your LOWEST pain in past 24 hours:  0 = No pain  to  10 = Worst pain imaginable 2   What number best describes your WORST pain in past 24 hours:  0 = No pain  to  10 = Worst pain imaginable 5   When is your pain worst Constant   What non-medicine treatments have you already had for your pain physical therapy    chiropractic care    TENS (electrical stimulator)    spine injections (shots)    exercise                1/10/2024    10:12 AM   MICHEAL-7 SCORE   Total Score 0 (minimal anxiety)   Total Score 0            1/11/2024      8:48 AM   PHQ-2 (  [employer])   Q1: Little interest or pleasure in doing things 0   Q2: Feeling down, depressed or hopeless 0   PHQ-2 Score 0             No data to display                   Past Medical History:  Past Medical History:   Diagnosis Date    Actinic keratosis     Gastroesophageal reflux disease without esophagitis     HLD (hyperlipidemia)     Spondylosis of lumbosacral spine with radiculopathy        Past Surgical History:  Past Surgical History:   Procedure Laterality Date    APPENDECTOMY       SECTION      x3    IR LUMBAR EPIDURAL STEROID INJECTION Bilateral 2023    L4-5    IR LUMBAR EPIDURAL STEROID INJECTION Bilateral 2023    L4-5    TONSILLECTOMY      XR WRIST SURGERY JEFF RIGHT      with pins       Medications:  Current Outpatient Medications   Medication Sig Dispense Refill    alendronate (FOSAMAX) 70 MG tablet Take 1 tablet (70 mg) by mouth every 7 days 12 tablet 3    Ascorbic Acid (VITAMIN C) 500 MG CAPS Take 500 mg by mouth daily 90 capsule 3    calcium citrate-vitamin D (CITRACAL) 200-6.25 MG-MCG TABS per tablet Take 1 tablet by mouth 4 times daily Calcium Citrate at 1000 MG Total - 77%  Vitamin D at 2000 international unit(s) - 250%      cholecalciferol (VITAMIN D3) 25 mcg (1000 units) capsule Take 1 capsule (25 mcg) by mouth daily 90 capsule 3    fish oil-omega-3 fatty acids 1000 MG capsule Take 2 capsules (2 g) by mouth daily 180 capsule 3    fluticasone (FLONASE) 50 MCG/ACT nasal spray Spray 1 spray into both nostrils daily as needed for rhinitis or allergies 11.1 mL 2    meloxicam (MOBIC) 7.5 MG tablet Take 1 tablet (7.5 mg) by mouth daily as needed for moderate pain 90 tablet 1    multivitamin w/minerals (THERA-VIT-M) tablet Take 1 tablet by mouth daily Generic 90 tablet 3    omeprazole (PRILOSEC) 40 MG DR capsule Take 1 capsule (40 mg) by mouth daily 90 capsule 3    simvastatin (ZOCOR) 20 MG tablet Take 1 tablet (20 mg) by mouth at bedtime 90 tablet 3          Medications related to Pain Management:   Medications related to Pain Management (From now, onward)      None            Allergies:       Allergies   Allergen Reactions    Augmentin [Amoxicillin-Pot Clavulanate] Diarrhea    Penicillins Itching       Social History:    Social History     Socioeconomic History    Marital status:      Spouse name: Not on file    Number of children: Not on file    Years of education: Not on file    Highest education level: Not on file   Occupational History    Not on file   Tobacco Use    Smoking status: Never    Smokeless tobacco: Never   Substance and Sexual Activity    Alcohol use: Yes     Comment: about 3 drinks a month    Drug use: Never    Sexual activity: Not on file   Other Topics Concern    Not on file   Social History Narrative    Not on file     Social Determinants of Health     Financial Resource Strain: Low Risk  (12/4/2023)    Financial Resource Strain     Within the past 12 months, have you or your family members you live with been unable to get utilities (heat, electricity) when it was really needed?: No   Food Insecurity: Low Risk  (12/4/2023)    Food Insecurity     Within the past 12 months, did you worry that your food would run out before you got money to buy more?: No     Within the past 12 months, did the food you bought just not last and you didn t have money to get more?: No   Transportation Needs: Low Risk  (12/4/2023)    Transportation Needs     Within the past 12 months, has lack of transportation kept you from medical appointments, getting your medicines, non-medical meetings or appointments, work, or from getting things that you need?: No   Physical Activity: Not on file   Stress: Not on file   Social Connections: Not on file   Interpersonal Safety: Not on file   Housing Stability: Low Risk  (12/4/2023)    Housing Stability     Do you have housing? : Yes     Are you worried about losing your housing?: No     Social History     Social History  "Narrative    Not on file         Family history:  Family History   Problem Relation Age of Onset    Heart Failure Mother     Ovarian Cancer Mother     Ulcers Father     Rheumatoid Arthritis Father     Myocardial Infarction Father     Breast Cancer Maternal Grandmother          Physical Exam:  Vitals:    01/11/24 0848   BP: (!) 173/93   BP Location: Right arm   Patient Position: Chair   Cuff Size: Adult Large   Pulse: 81   SpO2: 99%       General: Awake in no apparent distress.   Eyes: Sclerae are anicteric. PERRLA, EOMI   Neck: supple, no masses.   Lungs: unlabored.   Heart: regular rate and rhythm   Abdomen: soft non tender.  Extremities: Pulses are well palpable, no peripheral edema.   Musculoskeletal: 4 out of 5 muscle strength of the left lower extremity, 5 out of 5 muscle strength in distal extremities.  Straight leg test is positive on the right.  There is midline and paraspinal tenderness approximately L4-S1.  The patient changes position without pain behavior. The patient walks with a normal gait. Posture is normal.   Neurologic exam: Sensation to light touch intact throughout all dermatomes bilateral upper extremities and lower extremities  Psychiatric; Normal affect.   Skin: Warm and Dry.       LABORATORY VALUES:   Recent Labs   Lab Test 12/11/23  0904      POTASSIUM 4.2   CHLORIDE 100   CO2 26   ANIONGAP 9   GLC 94   BUN 13.9   CR 0.73   THAIS 10.0       CBC RESULTS: No results for input(s): \"WBC\", \"RBC\", \"HGB\", \"HCT\", \"MCV\", \"MCH\", \"MCHC\", \"RDW\", \"PLT\" in the last 94043 hours.    Most Recent 3 INR's:No lab results found.           ASSESSMENT/PLAN:                             ASSESSMENT:    Diagnoses         Codes Comments    Lumbar radiculopathy, chronic    -  Primary M54.16     Spondylosis of lumbar spine     M47.816     Spinal stenosis at L4-L5 level     M48.061              PLAN:    - Medications.     Meloxicam 7.5 mg daily  500 mg 4 times daily as needed    - Interventional procedures:  Order " lumbar epidural steroid injection.  Risk of the procedure including bleeding, infection, failure procedure discussed with the patient.    - Labs and imaging: Will obtain lumbar MRI images from the outside medical center    - Rehab: The patient is also encouraged to stay active as tolerated.    PT referral for core-strengthening exercises to relieve back pain include the pelvic tilt, cat-cow pose, bird dog, high and low planks, crunches, and exercises performed using a Swiss ball (or exercise ball).       - Psychology: No current needs.    - Integrated medicine: ordered acupuncture therapy.    - Disposition:   We will see the patient for above mentioned procedure.       Assessment will be ongoing with changes in treatment as indicated.  Benefits/risks/alternatives to treatment have been reviewed and the patient has been instructed to contact this office if they have any questions or concerns.  This plan of care has been discussed with the patient and the patient is in agreement.         Again, thank you for allowing me to participate in the care of your patient.      Sincerely,    Omid Escobar MD

## 2024-01-11 NOTE — PATIENT INSTRUCTIONS
Medications:    methocarbamol (ROBAXIN) 500 MG tablet. Take 1 tablet (500 mg) by mouth 4 times daily       *Please provide the clinic with a minium of 1 week notice, on all prescription refills.       Referrals:     Acupuncture Referral.  -Please call your insurance provider to find out about acupuncture coverage, being that not all policies cover acupuncture services.       Physical Therapy Referral placed- Pool Therapy referral-external. Please call to schedule.        Treatment planning:    Release of information for signed for MRI records.       Recommended Follow up:      Follow up as needed. Call if interested in injection.    Please call [phone] to schedule your clinic appointment if you don't already have an appointment scheduled.        To speak with a nurse, schedule/reschedule/cancel a clinic appointment, or request a medication refill call: [phone]    You can also reach us by HZO: [email or web address]

## 2024-01-16 ENCOUNTER — TELEPHONE (OUTPATIENT)
Dept: ANESTHESIOLOGY | Facility: CLINIC | Age: 79
End: 2024-01-16
Payer: MEDICARE

## 2024-01-16 PROBLEM — M54.16 LUMBAR RADICULOPATHY, CHRONIC: Status: ACTIVE | Noted: 2024-01-11

## 2024-01-16 RX ORDER — DIAZEPAM 5 MG
5-10 TABLET ORAL
Status: CANCELLED | OUTPATIENT
Start: 2024-01-16

## 2024-01-16 NOTE — TELEPHONE ENCOUNTER
Patient is scheduled for surgery with Dr. Escobar    Spoke with: patient    Date of Surgery: 02/08/24    [place] [facility] [place]    Informed patient they will need an adult  yes    Additional comments: patient is aware of date and time of the procedure        Kyleigh Villafana MA on 1/16/2024 at 4:09 PM

## 2024-01-18 ENCOUNTER — TELEPHONE (OUTPATIENT)
Dept: ANESTHESIOLOGY | Facility: CLINIC | Age: 79
End: 2024-01-18
Payer: MEDICARE

## 2024-01-18 NOTE — TELEPHONE ENCOUNTER
Received records from [facility] from [facility] [address] [place] [address]     [phone]  Fax [phone]    Sent to scanning

## 2024-02-08 ENCOUNTER — HOSPITAL ENCOUNTER (OUTPATIENT)
Facility: AMBULATORY SURGERY CENTER | Age: 79
Discharge: HOME | End: 2024-02-08
Attending: ANESTHESIOLOGY | Admitting: ANESTHESIOLOGY
Payer: MEDICARE

## 2024-02-08 ENCOUNTER — ANCILLARY PROCEDURE (OUTPATIENT)
Dept: RADIOLOGY | Facility: AMBULATORY SURGERY CENTER | Age: 79
End: 2024-02-08
Attending: ANESTHESIOLOGY
Payer: MEDICARE

## 2024-02-08 VITALS
OXYGEN SATURATION: 98 % | RESPIRATION RATE: 16 BRPM | TEMPERATURE: 97.7 F | SYSTOLIC BLOOD PRESSURE: 167 MMHG | DIASTOLIC BLOOD PRESSURE: 108 MMHG

## 2024-02-08 DIAGNOSIS — M54.16 LUMBAR RADICULOPATHY, CHRONIC: ICD-10-CM

## 2024-02-08 DIAGNOSIS — M54.16 LUMBAR RADICULOPATHY: ICD-10-CM

## 2024-02-08 PROCEDURE — 62323 NJX INTERLAMINAR LMBR/SAC: CPT

## 2024-02-08 RX ORDER — BUPIVACAINE HYDROCHLORIDE 2.5 MG/ML
INJECTION, SOLUTION EPIDURAL; INFILTRATION; INTRACAUDAL DAILY PRN
Status: DISCONTINUED | OUTPATIENT
Start: 2024-02-08 | End: 2024-02-08 | Stop reason: HOSPADM

## 2024-02-08 RX ORDER — LIDOCAINE HYDROCHLORIDE 10 MG/ML
INJECTION, SOLUTION EPIDURAL; INFILTRATION; INTRACAUDAL; PERINEURAL DAILY PRN
Status: DISCONTINUED | OUTPATIENT
Start: 2024-02-08 | End: 2024-02-08 | Stop reason: HOSPADM

## 2024-02-08 RX ORDER — DIAZEPAM 5 MG
5-10 TABLET ORAL
Status: DISCONTINUED | OUTPATIENT
Start: 2024-02-08 | End: 2024-02-09 | Stop reason: HOSPADM

## 2024-02-08 RX ORDER — METHYLPREDNISOLONE ACETATE 40 MG/ML
INJECTION, SUSPENSION INTRA-ARTICULAR; INTRALESIONAL; INTRAMUSCULAR; SOFT TISSUE DAILY PRN
Status: DISCONTINUED | OUTPATIENT
Start: 2024-02-08 | End: 2024-02-08 | Stop reason: HOSPADM

## 2024-02-08 RX ORDER — IOPAMIDOL 408 MG/ML
INJECTION, SOLUTION INTRATHECAL DAILY PRN
Status: DISCONTINUED | OUTPATIENT
Start: 2024-02-08 | End: 2024-02-08 | Stop reason: HOSPADM

## 2024-02-08 NOTE — DISCHARGE INSTRUCTIONS
Home Care Instructions after an Epidural Steroid Pain Injection    A lumbar epidural steroid injection delivers steroid medication directly into the area that may be causing your lower back pain and/or leg pain. A cervical or thoracic epidural steroid injection delivers steroids into the epidural space surrounding spinal nerve roots to help relieve pain in the upper spine/neck.    Activity  -Rest today  -Do not work today  -Resume normal activity tomorrow  -DO NOT shower for 24 hours  -DO NOT remove bandaid for 24 hours    Pain  -You may experience soreness at the injection site for one or two days  -You may use an ice pack for 20 minutes every 2 hours for the first 24 hours  -You may use a heating pad after the first 24 hours  -You may use Tylenol (acetaminophen) every 4 hours or other pain medicines as     directed by your physician    You may experience numbness radiating into your legs or arms (depending on the procedure location). This numbness may last several hours. Until sensation returns to normal; please use caution in walking, climbing stairs, and stepping out of your vehicle, etc.    Common side effects of steroids:  Not everyone will experience corticosteroid side effects. If side effects are experienced, they will gradually subside in the 7-10 day period following an injection. Most common side effects include:  -Flushed face and/or chest  -Feeling of warmth, particularly in the face but could be an overall feeling of warmth  -Increased blood sugar in diabetic patients  -Menstrual irregularities my occur. If taking hormone-based birth control an alternate method of birth control is recommended  -Sleep disturbances and/or mood swings are possible  -Leg cramps    Please contact us if you have:  -Severe pain  -Fever more than 101.5 degrees Fahrenheit  -Signs of infection at the injection site (redness, swelling, or drainage)    FOR PAIN CENTER PATIENTS:  If you have questions, please contact the Pain  Clinic at [phone] Option #1 between the hours of 7:00 am and 3:00 pm Monday through Friday. After office hours you can contact the on call provider by dialing [phone]. If you need immediate attention, we recommend that you go to a hospital emergency room or dial [phone].

## 2024-02-08 NOTE — OP NOTE
Patient: Ericka Lyman Age: 78 year old   MRN: [id number] Attending: Dr. Escobar     Date of Visit: February 8, 2024      PAIN MEDICINE CLINIC PROCEDURE NOTE    ATTENDING CLINICIAN:    Omid Escobar MD    ASSISTANT CLINICIAN:  Brent (interventional radiology resident)    PREPROCEDURE DIAGNOSES:  1.  Lumbar radiculopathy  2.  Chronic low back pain       PROCEDURE(S) PERFORMED:  1.  Interlaminar lumbar epidural steroid injection   2.  Fluoroscopic guidance for the above-named procedure(s)      ANESTHESIA:  Local.    INDICATIONS:  Ericka Lyman is a 78 year old female with a history of  chronic low back pain secondary to bilateral lumbosacral radiculopathy .  The patient stated that the patient was in their usual state of health and denied recent anticoagulant use or recent infections.  Therefore, the plan is to perform above mentioned procedures.     Procedure Details:  The patient was met in the procedure room, where the patient was identified by name, medical record number and date of birth.  All of the patient s last minute questions were answered. Written informed consent was obtained and saved in the electronic medical record, after the risks, benefits, and alternatives were discussed with the patient.      A formal time-out procedure was performed, as per protocol, including patient name, title of procedure, and site of procedure, and all in the room concurred.  Routine monitors were applied.      The patient was placed in the prone position on the procedure room table.  All pressure points were checked and comfortably padded.  Routine monitors were placed.  Vital signs were stable.    A chlorhexidine prep was completed followed by sterile draping per standard procedure.     The AP fluoroscopic view was optimized for approach at L5-S1 interspace.  The skin over the interspace was infiltrated with 4-5 mL of 1% Lidocaine using a 25 gauge, 1.5 inch needle.  A 20-gauge 3-1/2 inch Tuohy needle was advanced under  fluoroscopic guidance with left paramedial approach until it touched lamina. Mutiple AP and lateral fluoroscopic images at this time  are taken as Tuohy needle was advanced to the epidural space. The epidural space was identified, without evidence of blood, cerebrospinal fluid, or parasthesia throughout. Needle tip placement within the epidural space was further confirmed with 1-2 mL of nonionic contrast agent, with the epidural space visualized in the AP and lateral fluoroscopic view(s) with appropriate spread of the agent with fat vacuolization and no intravascular or intrathecal spread noted. Next, 8 mL of a treatment solution containing 2 mL of preservative free 0.25% bupivacaine, 1 mL of Depo-Medrol 40 mg/mL and 5 mL preservative free normal saline was administered slowly. The needle was withdrawn.      Light pressure was held at the puncture site(s) to prevent ecchymosis and oozing.  The patient's skin was cleansed, and hemostasis was confirmed.  Band-aids were applied to the needle injection site(s).      Condition:    The patient remained awake and alert throughout the procedure.  The patient tolerated the procedure well and was monitored for approximately 15 minutes afterward in the post procedure area.  There were no immediate post procedure complications noted.  The patient was then discharged to home as per protocol.

## 2024-02-13 ENCOUNTER — OFFICE VISIT (OUTPATIENT)
Dept: INTERNAL MEDICINE | Facility: CLINIC | Age: 79
End: 2024-02-13
Payer: MEDICARE

## 2024-02-13 VITALS
WEIGHT: 147.8 LBS | HEIGHT: 59 IN | BODY MASS INDEX: 29.8 KG/M2 | SYSTOLIC BLOOD PRESSURE: 137 MMHG | DIASTOLIC BLOOD PRESSURE: 92 MMHG | OXYGEN SATURATION: 99 % | HEART RATE: 84 BPM

## 2024-02-13 DIAGNOSIS — I10 ESSENTIAL HYPERTENSION: Primary | ICD-10-CM

## 2024-02-13 DIAGNOSIS — T78.40XA ALLERGIC REACTION, INITIAL ENCOUNTER: ICD-10-CM

## 2024-02-13 DIAGNOSIS — Z12.31 ENCOUNTER FOR SCREENING MAMMOGRAM FOR MALIGNANT NEOPLASM OF BREAST: ICD-10-CM

## 2024-02-13 DIAGNOSIS — Z00.00 HEALTHCARE MAINTENANCE: ICD-10-CM

## 2024-02-13 PROCEDURE — 99214 OFFICE O/P EST MOD 30 MIN: CPT | Mod: GC

## 2024-02-13 RX ORDER — AMLODIPINE BESYLATE 2.5 MG/1
2.5 TABLET ORAL DAILY
Qty: 30 TABLET | Refills: 1 | Status: SHIPPED | OUTPATIENT
Start: 2024-02-13 | End: 2024-03-11 | Stop reason: ALTCHOICE

## 2024-02-13 NOTE — PROGRESS NOTES
"I, Hitesh De La Garza MD saw the patient with the resident, and agree with the resident's findings and plan of care as documented in the resident's note.  BP (!) 137/92 (BP Location: Right arm, Patient Position: Sitting, Cuff Size: Adult Regular)   Pulse 84   Ht 1.486 m (4' 10.5\")   Wt 67 kg (147 lb 12.8 oz)   SpO2 99%   BMI 30.36 kg/m    I personally reviewed vital signs and past record.  Key findings: multiple chronic medical problems. Had some BP elevation including DBP.  "

## 2024-02-13 NOTE — PATIENT INSTRUCTIONS
Please check blood pressure at home. Send in readings over 2 weeks period and we will make adjustment to amlodipine as needed.

## 2024-02-13 NOTE — PROGRESS NOTES
Ericka is a 78 year old that presents in clinic today for the following:     Chief Complaint   Patient presents with    Hypertension           2/13/2024     8:11 AM   Additional Questions   Roomed by Smita Kebede   Accompanied by Deloris-     Screenings as of today     Fallen 2 or more times in the past year? No        Smita Kebede at 8:17 AM on 2/13/2024

## 2024-02-13 NOTE — PROGRESS NOTES
"                     PRIMARY CARE CENTER       SUBJECTIVE:  Ercika Lyman is a 78 year old female with a PMHx of osteopenia, GERD, HLD who comes in for evaluation of high blood pressure. Patient was at her orthopedic appointment on 2/8/24 and was noted to have a blood pressure of 190/133. She also had other elevated readings above the normal. She checked her BP at home, which ranged from 131-148/81-93. She exercises and does not consume much salt in her diet.    Also, patient has a family history of breast cancer and asks to continue obtaining mammograms. We discussed the recommendations by the USPSTF and breast cancer foundation. Patient wants to continue mammograms.    Patient has a documented history of penicillins and amoxicillin-clavulanate. She asks for allergy testing.    Medications and allergies reviewed by me today.     ROS:   Constitutional, neuro, ENT, endocrine, pulmonary, cardiac, gastrointestinal, genitourinary, musculoskeletal, integument and psychiatric systems are negative, except as otherwise noted.    OBJECTIVE:    BP (!) 137/92 (BP Location: Right arm, Patient Position: Sitting, Cuff Size: Adult Regular)   Pulse 84   Ht 1.486 m (4' 10.5\")   Wt 67 kg (147 lb 12.8 oz)   SpO2 99%   BMI 30.36 kg/m     Wt Readings from Last 1 Encounters:   02/13/24 67 kg (147 lb 12.8 oz)     GENERAL: alert and no distress  EYES: Eyes grossly normal to inspection, PERRL and conjunctivae and sclerae normal  HENT: ear canals and TM's normal, nose and mouth without ulcers or lesions  NECK: no adenopathy, no asymmetry, masses, or scars  RESP: lungs clear to auscultation - no rales, rhonchi or wheezes  CV: regular rate and rhythm, normal S1 S2, no S3 or S4, no murmur, click or rub, no peripheral edema  ABDOMEN: soft, nontender, no hepatosplenomegaly, no masses and bowel sounds normal  MS: no gross musculoskeletal defects noted, no edema  SKIN: no suspicious lesions or rashes  NEURO: Normal strength and tone, mentation " intact and speech normal  PSYCH: mentation appears normal, affect normal/bright     ASSESSMENT/PLAN:    Ericka was seen today for hypertension.    Diagnoses and all orders for this visit:    Essential hypertension  Patient with SBP ranging from 130-148 and DBP from 81-93 at home. Also with clinic readings up to 190/133. Discussed continuation of healthy diet and exercise. Will start patient on low dose amlodipine. She will continue to monitor blood pressure at home and keep a journal. Patient instructed to message clinic with home BP readings after 2 weeks and amlodipine may be titrated as needed.   -     amLODIPine (NORVASC) 2.5 MG tablet; Take 1 tablet (2.5 mg) by mouth daily  - Home BP monitoring  - Healthy diet with reduced salt intake    Healthcare maintenance  Encounter for screening mammogram for malignant neoplasm of breast  -     *MA Screening Digital Bilateral; Future    Allergic reaction, initial encounter  -     Adult Allergy/Asthma  Referral; Future       Pt should return to clinic for f/u with Dr. Gutierrez on 3/11/2024     Keaton Costello Jr., MD  Internal Medicine- PGY3  [facility]  Feb 13, 2024    Pt was seen and plan of care discussed with Dr. De La Garza.

## 2024-02-16 ENCOUNTER — TELEPHONE (OUTPATIENT)
Dept: PHYSICAL THERAPY | Facility: CLINIC | Age: 79
End: 2024-02-16
Payer: MEDICARE

## 2024-02-16 NOTE — TELEPHONE ENCOUNTER
confirmed eval for Monday 2/19/24 and let her know we are able to see some of her out of state records in care everywhere as patient expressed concern with being able to remember surgery dates. I let patient know she can give her best estimate on the forms and if we need more info the provider can dig in her care everywhere or talk with her previous care team.

## 2024-02-19 ENCOUNTER — THERAPY VISIT (OUTPATIENT)
Dept: PHYSICAL THERAPY | Facility: CLINIC | Age: 79
End: 2024-02-19
Attending: ANESTHESIOLOGY
Payer: MEDICARE

## 2024-02-19 DIAGNOSIS — M47.816 SPONDYLOSIS OF LUMBAR SPINE: Primary | ICD-10-CM

## 2024-02-19 PROCEDURE — 97112 NEUROMUSCULAR REEDUCATION: CPT | Mod: GP

## 2024-02-19 PROCEDURE — 97110 THERAPEUTIC EXERCISES: CPT | Mod: GP

## 2024-02-19 PROCEDURE — 97161 PT EVAL LOW COMPLEX 20 MIN: CPT | Mod: GP

## 2024-02-19 NOTE — PROGRESS NOTES
PHYSICAL THERAPY EVALUATION  Type of Visit: Evaluation    See electronic medical record for Abuse and Falls Screening details.    Subjective       Presenting condition or subjective complaint: stenosis, sciatic, scoliosis, sacroiliac = back pain  Pt is complaining of chronic back pain.  She has had it for years but it seems to have gotten worse recently.  It radiates to the posterior thigh, (L>R), which has not happened before. She walks with a 4WW that she has used most of the time for that past 4 months.  When she is in the home, Mosque, or a restaurant with her  she uses a SPC.  She moved to [facility] from [place] 3 months ago to be nearer to family.  She has had good success with aquatic exercise before the move and is interested in doing again.    Date of onset: 02/19/24 (chronic, years)    Relevant medical history: Arthritis; Concussions; Dizziness; Hearing problems; High blood pressure; History of fractures; Menopause; Osteoporosis; Overweight   Dates & types of surgery: C-sections 5/18/72, 9/6/73, 7/21/76, pins and plate in wrist 10/2/09    Prior diagnostic imaging/testing results: MRI     Prior therapy history for the same diagnosis, illness or injury: Yes therapy in the weight room and also the pool, it has been about 7-8 years    Prior Level of Function  Transfers: Independent  Ambulation: Independent, Assistive equipment  ADL: Independent, Assistive equipment  IADL: Housekeeping, Laundry    Living Environment  Social support: With a significant other or spouse   Type of home: Apartment/condo   Stairs to enter the home: No       Ramp: No   Stairs inside the home: No       Help at home: None  Equipment owned: Straight Cane; Walker with wheels; Grab bars     Employment: No    Hobbies/Interests: reading, sewing, traveling    Patient goals for therapy: I would like to walk without the cane or walker    Pain assessment: 5/10 at worst, 0/10 at best     Objective      Cognitive Status  Examination  Orientation: Oriented to person, place and time   Level of Consciousness: Alert  Follows Commands and Answers Questions: 100% of the time  Personal Safety and Judgement: Intact  Memory: Intact    OBSERVATION: Pleasant female in NAD.  INTEGUMENTARY: Intact  POSTURE: Sitting Posture: Rounded shoulders, Forward head, Thoracic kyphosis increased  PALPATION:   RANGE OF MOTION: LE/lumbar ROM WNL  STRENGTH: impaired core strength demonstrated with challenge coordinating deadbug/birddog exercises  Pain: - none + mild ++ moderate +++ severe  Strength Scale: 0-5/5 Left Right   Hip Flexion 4 4+   Hip Abduction 4 4+   Hip Adduction 4+ 4+   Knee Flexion 5 5   Knee Extension 5 5     GAIT:   Level of 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  The following significant findings have been identified: Pain, Decreased strength, Impaired gait, Impaired muscle performance, Decreased activity tolerance, and Impaired posture. These impairments interfere with their ability to perform self care tasks, household chores, household mobility, and community mobility as compared to previous level of function.     Clinical Decision Making (Complexity):  Clinical Presentation: Stable/Uncomplicated  Clinical Presentation Rationale: based on medical and personal factors listed in PT evaluation  Clinical Decision Making (Complexity): Low complexity    PLAN OF CARE  Treatment Interventions:  Interventions: Gait Training, Manual Therapy, Neuromuscular Re-education, Therapeutic Activity, Therapeutic Exercise    Long Term Goals     PT Goal 1  Goal Identifier: Walking  Goal Description: Pt will be able to  walk for 30 minutes without pain and without AD in order to improve mobility and QOL.  Goal Progress: ongoing  Target Date: 05/18/24      Frequency of Treatment: 1x/week  Duration of Treatment: 90 days    Recommended Referrals to Other Professionals:     Education Assessment:   Learner/Method: Patient  Education Comments: HEP, POC    Risks and benefits of evaluation/treatment have been explained.   Patient/Family/caregiver agrees with Plan of Care.     Evaluation Time:     PT Eval, Low Complexity Minutes (49239): 15       Signing Clinician: MARIE Wilburn [facility]                                                                                   OUTPATIENT PHYSICAL THERAPY      PLAN OF TREATMENT FOR OUTPATIENT REHABILITATION   Patient's Last Name, First Name, Ericka Winters YOB: 1945   Provider's Name   [facility]   Medical Record No.  [id number]     Onset Date: 02/19/24 (chronic, years)  Start of Care Date: 02/19/24     Medical Diagnosis:  Spondylosis of lumbar spine (M47.816)      PT Treatment Diagnosis:  Low back pain with impaired muscle performance Plan of Treatment  Frequency/Duration: 1x/week/ 90 days    Certification date from 02/19/24 to 05/18/24         See note for plan of treatment details and functional goals     Anthony Meneses, PT                         I CERTIFY THE NEED FOR THESE SERVICES FURNISHED UNDER        THIS PLAN OF TREATMENT AND WHILE UNDER MY CARE     (Physician attestation of this document indicates review and certification of the therapy plan).              Referring Provider:  Omid Escobar    Initial Assessment  See Epic Evaluation- Start of Care Date: 02/19/24

## 2024-02-29 ENCOUNTER — OFFICE VISIT (OUTPATIENT)
Dept: AUDIOLOGY | Facility: CLINIC | Age: 79
End: 2024-02-29
Payer: MEDICARE

## 2024-02-29 DIAGNOSIS — H90.3 SENSORINEURAL HEARING LOSS (SNHL), BILATERAL: Primary | ICD-10-CM

## 2024-02-29 PROCEDURE — 92593 PR HEARING AID CHECK, BINAURAL: CPT | Performed by: AUDIOLOGIST

## 2024-02-29 NOTE — PROGRESS NOTES
AUDIOLOGY REPORT    SUBJECTIVE: Ericka Lyman, 78 year old year old female, was seen at the Audiology Clinic at the [facility] on 2/29/24 for a hearing evaluation and hearing aid check. She was accompanied by her , Deloris. Ericka and her  moved from [place], [place], in October and are here to establish hearing and hearing aid care. Ericka's , Deloris, provided us with a copy of Ericka's audiologic and otologic records from [place]. Ericka's last hearing evaluation is from [place] on 08/02/2023 and results showed a mild rising to normal sloping to moderate sensorineural hearing loss bilaterally. She also reports a history of impacted cerumen and uses baby oil in her ears once per week and hopes that is helping to soften the wax. Ericka reports that her hearing has not changed since her last hearing evaluation on 08/22/2023. She reports some dizziness due to high blood pressure and denies any falls. Ericka also denies tinnitus, aural fullness, otalgia, a history of ear surgeries, and noise exposure. Both Ericka and her  requested to skip the hearing evaluation, if possible, since her last assessment was in August 2023 and she denies changes in hearing since then.     Ericka was fit with binaural Oticon Real 2 miniRITE R RICs in August 2023. The volume of her hearing aids was originally too loud so her audiologist in [place] turned them down at her initial hearing aid check. Today, she reports that the volume of the hearing aids is too soft. Ericka is also hearing a whooshing noise in her left ear when the hearing aid is in her ear. Ericka would like for the buttons on her hearing aids to be inactive.      OBJECTIVE: Due to recent hearing evaluation within the last six months and patient request, the hearing evaluation was deferred today. Otoscopic examination revealed slight, non-occluding cerumen bilaterally.    Performed a listening check, replaced the  samantha, and cleaned Ericka's hearing aids. Connected Ericka's hearing aids to the software and firmware was updated. Increased overall gain by 2 dB and Ericka then reported good volume and sound quality. Turned off manual button control for Ericka's hearing aids. Ran the feedback analyzer and no feedback was noted. Discussed with Ericka that the whooshing sound while wearing the left hearing aid is likely feedback; therefore, we reviewed solutions to manage potential feedback, including how to clean her hearing aids and how to ensure proper fit of the hearing aid in her ear. Further discussed that if the feedback continues after cleaning and ensuring a proper fit, that feedback may be due to cerumen in her ear canal and she may have to get her ears cleaned. Ericka reported satisfaction with today's adjustments and appointment. The patient verbally expresses understanding that there is a cost for hearing aid check appointments, but repairs will be covered as long as her hearing aids are still in warranty.     EAR(S) FIT: Bilateral  HEARING AID MODEL NAME: OtNantMobile Real 2 miniRITE-R  HEARING AID STYLE: -in-the-ear behind-the-ear (Color: Terracotta)  EARMOLDS/TIP: Minifit 8 mm double vent domes  SERIAL NUMBERS: Right: [id number] Left: [id number]  WARRANTY END DATE: 09/07/2026    ASSESSMENT: Hearing aid check completed. Changes to hearing aids were completed as outlined above.     PLAN: It is recommended that Ericka return for follow-up as needed, or at least every 9-12 months for a hearing evaluation, and cleaning and assessment of hearing aids. Ericka and her  are encouraged to utilize the hearing aid walk-in/drop-off clinic when needed. Please call this clinic with any questions regarding today s appointment.      Nicolle Vazquez M.A.  Audiology Doctoral Extern  [place] #[id number]     I was present with the patient for the entire audiology appointment including all procedures/testing performed by the AuD student, and agree  with the student's assessment and plan as documented.     Johnathan Mederos, CCC-A  Clinical Audiologist   [place] #[id number]

## 2024-03-04 ENCOUNTER — THERAPY VISIT (OUTPATIENT)
Dept: PHYSICAL THERAPY | Facility: CLINIC | Age: 79
End: 2024-03-04
Attending: ANESTHESIOLOGY
Payer: MEDICARE

## 2024-03-04 DIAGNOSIS — M47.816 SPONDYLOSIS OF LUMBAR SPINE: Primary | ICD-10-CM

## 2024-03-04 PROCEDURE — 97112 NEUROMUSCULAR REEDUCATION: CPT | Mod: GP

## 2024-03-04 PROCEDURE — 97110 THERAPEUTIC EXERCISES: CPT | Mod: GP

## 2024-03-11 ENCOUNTER — OFFICE VISIT (OUTPATIENT)
Dept: INTERNAL MEDICINE | Facility: CLINIC | Age: 79
End: 2024-03-11
Payer: MEDICARE

## 2024-03-11 VITALS
BODY MASS INDEX: 29.95 KG/M2 | DIASTOLIC BLOOD PRESSURE: 93 MMHG | SYSTOLIC BLOOD PRESSURE: 155 MMHG | WEIGHT: 145.8 LBS | OXYGEN SATURATION: 98 % | HEART RATE: 82 BPM

## 2024-03-11 DIAGNOSIS — E78.5 HYPERLIPIDEMIA, UNSPECIFIED HYPERLIPIDEMIA TYPE: ICD-10-CM

## 2024-03-11 DIAGNOSIS — I10 PRIMARY HYPERTENSION: Primary | ICD-10-CM

## 2024-03-11 DIAGNOSIS — M85.852 OSTEOPENIA OF NECK OF LEFT FEMUR: ICD-10-CM

## 2024-03-11 DIAGNOSIS — M47.816 SPONDYLOSIS OF LUMBAR SPINE: ICD-10-CM

## 2024-03-11 PROCEDURE — 99214 OFFICE O/P EST MOD 30 MIN: CPT | Performed by: HOSPITALIST

## 2024-03-11 RX ORDER — METHOCARBAMOL 500 MG/1
500 TABLET, FILM COATED ORAL 4 TIMES DAILY PRN
COMMUNITY
Start: 2024-03-11 | End: 2024-06-10

## 2024-03-11 RX ORDER — LISINOPRIL 5 MG/1
5 TABLET ORAL DAILY
Qty: 90 TABLET | Refills: 2 | Status: SHIPPED | OUTPATIENT
Start: 2024-03-11 | End: 2024-04-15 | Stop reason: SINTOL

## 2024-03-11 NOTE — PATIENT INSTRUCTIONS
- Stop amlodipine.   - Start on Lisinopril 5mg daily.   - Keep measuring blood pressure at home. Goal is mostly under 140/90.     - Labs for Lipid, CMP, Vitamin D again in June while fasting.       Follow up again in 6 months.

## 2024-03-11 NOTE — PROGRESS NOTES
"  Assessment & Plan   Problem List Items Addressed This Visit       Hyperlipidemia, unspecified hyperlipidemia type     - Check lipid and CMP while fasting in June.          Relevant Orders    Lipid panel reflex to direct LDL Fasting    Comprehensive metabolic panel (BMP + Alb, Alk Phos, ALT, AST, Total. Bili, TP)    Spondylosis of lumbar spine     - Continued on meloxicam 7.5mg daily, methocarbamol 500mg prn.          Relevant Medications    methocarbamol (ROBAXIN) 500 MG tablet    Osteopenia of neck of left femur     Last DEXA scan was in 2022, recommended to repeat at 2 years.   - Check vitamin D at next check of labs.   - Order for repeat DEXA scan.   - Patient in vitamin D and alendronate.          Relevant Medications    methocarbamol (ROBAXIN) 500 MG tablet    Other Relevant Orders    Vitamin D Deficiency    DX Hip/Pelvis/Spine    Primary hypertension - Primary     - Stop amlodipine.   - Start on Lisinopril 5mg daily.          Relevant Medications    lisinopril (ZESTRIL) 5 MG tablet        25 minutes spent by me on the date of the encounter doing chart review, history and exam, documentation and further activities per the note     BMI  Estimated body mass index is 29.95 kg/m  as calculated from the following:    Height as of 2/13/24: 1.486 m (4' 10.5\").    Weight as of this encounter: 66.1 kg (145 lb 12.8 oz).     No follow-ups on file.      Monika Barnett is a 78 year old, presenting for the following health issues:  Follow Up (3 month follow up. Pt has began PT and cortisone injections for back and hip issues), Hypertension (Pt following up on blood pressure), Dizziness (Pt reports new dizziness, began after blood pressure medication was started), and Refill Request (Pt has questions about omeprazole refill - received 20 day supply instead of 90 days?)      3/11/2024    11:21 AM   Additional Questions   Roomed by LIZABETH   Accompanied by      History of Present Illness       Hypertension: She " presents for follow up of hypertension.  She does check blood pressure  regularly outside of the clinic. Outside blood pressures have been over 140/90. She follows a low salt diet.     She eats 4 or more servings of fruits and vegetables daily.She consumes 0 sweetened beverage(s) daily.She exercises with enough effort to increase her heart rate 20 to 29 minutes per day.  She exercises with enough effort to increase her heart rate 4 days per week.   She is taking medications regularly.     Mentions that when she went for her back injection on 2/8/24, her blood pressure was up to the 190s. Patient followed up in the clinic and was placed on amlodipine 2.5mg daily. She mentions that since she started the medication she has been having some dizziness and unsteady walking. No falls. Mentions he is okay when she uses her walker. No swelling issues. No breathing issues. Patient brought in blood pressures and her  have been taking twice a day. Bpare mainly in the 120-130s/60-80s with occasional readings to 140s for systolic, and one reading at 95 for diastolic.       Review of Systems  Constitutional, neuro, ENT, endocrine, pulmonary, cardiac, gastrointestinal, genitourinary, musculoskeletal, integument and psychiatric systems are negative, except as otherwise noted.      Objective    BP (!) 155/93 (BP Location: Right arm, Patient Position: Sitting, Cuff Size: Adult Regular)   Pulse 82   Wt 66.1 kg (145 lb 12.8 oz)   SpO2 98%   BMI 29.95 kg/m    Body mass index is 29.95 kg/m .    Physical Exam   GENERAL: alert and no distress  EYES: Eyes grossly normal to inspection, PERRL and conjunctivae and sclerae normal  RESP: lungs clear to auscultation - no rales, rhonchi or wheezes  CV: regular rate and rhythm, normal S1 S2, no S3 or S4, no murmur, click or rub, no peripheral edema  ABDOMEN: soft, nontender, no hepatosplenomegaly, no masses and bowel sounds normal  MS: no gross musculoskeletal defects noted, no  edema  SKIN: no suspicious lesions or rashes  NEURO: Normal strength and tone, mentation intact and speech normal  PSYCH: mentation appears normal, affect normal/bright          Signed Electronically by: Fredi Gutierrez MD

## 2024-03-11 NOTE — ASSESSMENT & PLAN NOTE
Last DEXA scan was in 2022, recommended to repeat at 2 years.   - Check vitamin D at next check of labs.   - Order for repeat DEXA scan.   - Patient in vitamin D and alendronate.

## 2024-03-14 ENCOUNTER — ANCILLARY PROCEDURE (OUTPATIENT)
Dept: MAMMOGRAPHY | Facility: CLINIC | Age: 79
End: 2024-03-14
Attending: HOSPITALIST
Payer: MEDICARE

## 2024-03-14 DIAGNOSIS — Z12.31 VISIT FOR SCREENING MAMMOGRAM: ICD-10-CM

## 2024-03-14 PROCEDURE — 77067 SCR MAMMO BI INCL CAD: CPT | Mod: GC

## 2024-03-14 PROCEDURE — 77063 BREAST TOMOSYNTHESIS BI: CPT | Mod: GC

## 2024-03-20 ENCOUNTER — THERAPY VISIT (OUTPATIENT)
Dept: PHYSICAL THERAPY | Facility: CLINIC | Age: 79
End: 2024-03-20
Payer: MEDICARE

## 2024-03-20 DIAGNOSIS — M47.816 SPONDYLOSIS OF LUMBAR SPINE: Primary | ICD-10-CM

## 2024-03-20 PROCEDURE — 97110 THERAPEUTIC EXERCISES: CPT | Mod: GP

## 2024-03-20 PROCEDURE — 97112 NEUROMUSCULAR REEDUCATION: CPT | Mod: GP

## 2024-03-25 ENCOUNTER — THERAPY VISIT (OUTPATIENT)
Dept: PHYSICAL THERAPY | Facility: CLINIC | Age: 79
End: 2024-03-25
Payer: MEDICARE

## 2024-03-25 DIAGNOSIS — M47.816 SPONDYLOSIS OF LUMBAR SPINE: Primary | ICD-10-CM

## 2024-03-25 PROCEDURE — 97110 THERAPEUTIC EXERCISES: CPT | Mod: GP

## 2024-03-25 PROCEDURE — 97112 NEUROMUSCULAR REEDUCATION: CPT | Mod: GP

## 2024-03-28 NOTE — TELEPHONE ENCOUNTER
FUTURE VISIT INFORMATION      FUTURE VISIT INFORMATION:  Date: 6/18/24  Time: 10:40am  [place] [facility]  REFERRAL INFORMATION:  Referring providers clinic:  [facility]   Reason for visit/diagnosis  CE    RECORDS REQUESTED FROM:       Clinic name Comments Records Status Imaging Status   [facility]  Request for rec sent 3/28  Received and sent to scanning EPIC

## 2024-04-01 ENCOUNTER — THERAPY VISIT (OUTPATIENT)
Dept: PHYSICAL THERAPY | Facility: CLINIC | Age: 79
End: 2024-04-01
Payer: MEDICARE

## 2024-04-01 DIAGNOSIS — M47.816 SPONDYLOSIS OF LUMBAR SPINE: Primary | ICD-10-CM

## 2024-04-01 PROCEDURE — 97112 NEUROMUSCULAR REEDUCATION: CPT | Mod: GP

## 2024-04-01 PROCEDURE — 97110 THERAPEUTIC EXERCISES: CPT | Mod: GP

## 2024-04-05 ENCOUNTER — OFFICE VISIT (OUTPATIENT)
Dept: DERMATOLOGY | Facility: CLINIC | Age: 79
End: 2024-04-05
Payer: MEDICARE

## 2024-04-05 DIAGNOSIS — D22.9 MULTIPLE NEVI: Primary | ICD-10-CM

## 2024-04-05 DIAGNOSIS — Z12.83 ENCOUNTER FOR SCREENING FOR MALIGNANT NEOPLASM OF SKIN: ICD-10-CM

## 2024-04-05 DIAGNOSIS — D18.01 CHERRY ANGIOMA: ICD-10-CM

## 2024-04-05 DIAGNOSIS — L81.4 LENTIGINES: ICD-10-CM

## 2024-04-05 DIAGNOSIS — L82.1 SEBORRHEIC KERATOSES: ICD-10-CM

## 2024-04-05 PROCEDURE — 99203 OFFICE O/P NEW LOW 30 MIN: CPT | Performed by: PHYSICIAN ASSISTANT

## 2024-04-05 ASSESSMENT — PAIN SCALES - GENERAL: PAINLEVEL: NO PAIN (0)

## 2024-04-05 NOTE — NURSING NOTE
Dermatology Rooming Note    Ericka Lyman's goals for this visit include:   Chief Complaint   Patient presents with    Derm Problem     FBSE- no spots of concern     Bertha Salazar, EMT

## 2024-04-05 NOTE — PROGRESS NOTES
[facility] Dermatology Note  Encounter Date: Apr 5, 2024  Office Visit     Reviewed patients past medical history and pertinent chart review prior to patients visit today.     Dermatology Problem List:  [facility] [place]    No personal history of skin cancer.  No family history of skin cancer.  ____________________________________________    Assessment & Plan:     # Multiple nevi, trunk and extremities  # Solar lentigines  - No concerning features on dermoscopy. We discussed the importance of self exams at home.   - ABCDEs: Counseled ABCDEs of melanoma: Asymmetry, Border (irregularity), Color (not uniform, changes in color), Diameter (greater than 6 mm which is about the size of a pencil eraser), and Evolving (any changes in preexisting moles).  - Sun protection: Counseled SPF 30+ sunscreen, UPF clothing, sun avoidance, tanning bed avoidance.    # Cherry angiomas  # Seborrheic keratoses  - We discussed the benign nature of the skin lesions. No treatment required. Continued observation recommended. Follow up with any concerns.      Follow-up:  Annual for follow up full body skin exam, as needed for new or changing lesions or new concerns    All risks, benefits and alternatives were discussed with patient.  Patient is in agreement and understands the assessment and plan.  All questions were answered.  Tamika Ambrose PA-C  [facility] Dermatology    ____________________________________________    CC: Derm Problem (FBSE- no spots of concern)    HPI:  Ms. Ericka Lyman is a(n) 78 year old female who presents today as a new patient for a full body skin cancer screening. No specific cutaneous concerns today. The patient reports trying to be diligent with photoprotection.      Physical Exam:  Vitals: There were no vitals taken for this visit.  SKIN: Total skin excluding the genitalia areas was performed. The exam included the scalp, face, neck, bilateral arms, chest, back, abdomen, bilateral legs,  digits, mons pubis, buttocks, and nails.   - Jay I-II.  - Multiple tan/brown macules and papules scattered throughout exam, consistent with benign nevi. No concerning features on dermoscopy.   - Scattered tan, homogenous macules scattered on sun exposed skin, consistent with solar lentigines.   - Scattered waxy, stuck on appearing papules and patches, consistent with seborrheic keratoses.    - Several 1-2 mm red dome shaped symmetric papules, consistent with cherry angiomas.     Medications:  Current Outpatient Medications   Medication Sig Dispense Refill    alendronate (FOSAMAX) 70 MG tablet Take 1 tablet (70 mg) by mouth every 7 days 12 tablet 3    Ascorbic Acid (VITAMIN C) 500 MG CAPS Take 500 mg by mouth daily 90 capsule 3    calcium citrate-vitamin D (CITRACAL) 200-6.25 MG-MCG TABS per tablet Take 1 tablet by mouth 4 times daily Calcium Citrate at 1000 MG Total - 77%  Vitamin D at 2000 international unit(s) - 250%      cholecalciferol (VITAMIN D3) 25 mcg (1000 units) capsule Take 1 capsule (25 mcg) by mouth daily 90 capsule 3    fish oil-omega-3 fatty acids 1000 MG capsule Take 2 capsules (2 g) by mouth daily 180 capsule 3    fluticasone (FLONASE) 50 MCG/ACT nasal spray Spray 1 spray into both nostrils daily as needed for rhinitis or allergies 11.1 mL 2    lisinopril (ZESTRIL) 5 MG tablet Take 1 tablet (5 mg) by mouth daily 90 tablet 2    meloxicam (MOBIC) 7.5 MG tablet Take 1 tablet (7.5 mg) by mouth daily as needed for moderate pain 90 tablet 1    methocarbamol (ROBAXIN) 500 MG tablet Take 1 tablet (500 mg) by mouth 4 times daily as needed for muscle spasms      multivitamin w/minerals (THERA-VIT-M) tablet Take 1 tablet by mouth daily Generic 90 tablet 3    omeprazole (PRILOSEC) 40 MG DR capsule Take 1 capsule (40 mg) by mouth daily 90 capsule 3    simvastatin (ZOCOR) 20 MG tablet Take 1 tablet (20 mg) by mouth at bedtime 90 tablet 3     No current facility-administered medications for this visit.       Past Medical History:   Patient Active Problem List   Diagnosis    Need for Tdap vaccination    Hyperlipidemia, unspecified hyperlipidemia type    Actinic keratosis    Spondylosis of lumbar spine    Osteopenia of neck of left femur    Lumbar radiculopathy, chronic    Primary hypertension     Past Medical History:   Diagnosis Date    Actinic keratosis     Gastroesophageal reflux disease without esophagitis     HLD (hyperlipidemia)     HTN (hypertension)     Spondylosis of lumbosacral spine with radiculopathy        CC Fredi Gutierrez MD  [address] [place] [address] on close of this encounter.

## 2024-04-05 NOTE — LETTER
4/5/2024       RE: Ericka Lyman  [address]  [facility] [address]     Dear Colleague,    Thank you for referring your patient, Ericka Lyman, to the [facility] DERMATOLOGY [facility] [place] [facility]. Please see a copy of my visit note below.    [facility] Dermatology Note  Encounter Date: Apr 5, 2024  Office Visit     Reviewed patients past medical history and pertinent chart review prior to patients visit today.     Dermatology Problem List:  FBSC: 4/5/2024    No personal history of skin cancer.  No family history of skin cancer.  ____________________________________________    Assessment & Plan:     # Multiple nevi, trunk and extremities  # Solar lentigines  - No concerning features on dermoscopy. We discussed the importance of self exams at home.   - ABCDEs: Counseled ABCDEs of melanoma: Asymmetry, Border (irregularity), Color (not uniform, changes in color), Diameter (greater than 6 mm which is about the size of a pencil eraser), and Evolving (any changes in preexisting moles).  - Sun protection: Counseled SPF 30+ sunscreen, UPF clothing, sun avoidance, tanning bed avoidance.    # Cherry angiomas  # Seborrheic keratoses  - We discussed the benign nature of the skin lesions. No treatment required. Continued observation recommended. Follow up with any concerns.      Follow-up:  Annual for follow up full body skin exam, as needed for new or changing lesions or new concerns    All risks, benefits and alternatives were discussed with patient.  Patient is in agreement and understands the assessment and plan.  All questions were answered.  Tamika Ambrose PA-C  [facility] Dermatology    ____________________________________________    CC: Derm Problem (FBSE- no spots of concern)    HPI:  Ms. Ericka Lyman is a(n) 78 year old female who presents today as a new patient for a full body skin cancer screening. No specific  cutaneous concerns today. The patient reports trying to be diligent with photoprotection.      Physical Exam:  Vitals: There were no vitals taken for this visit.  SKIN: Total skin excluding the genitalia areas was performed. The exam included the scalp, face, neck, bilateral arms, chest, back, abdomen, bilateral legs, digits, mons pubis, buttocks, and nails.   - Jay I-II.  - Multiple tan/brown macules and papules scattered throughout exam, consistent with benign nevi. No concerning features on dermoscopy.   - Scattered tan, homogenous macules scattered on sun exposed skin, consistent with solar lentigines.   - Scattered waxy, stuck on appearing papules and patches, consistent with seborrheic keratoses.    - Several 1-2 mm red dome shaped symmetric papules, consistent with cherry angiomas.     Medications:  Current Outpatient Medications   Medication Sig Dispense Refill    alendronate (FOSAMAX) 70 MG tablet Take 1 tablet (70 mg) by mouth every 7 days 12 tablet 3    Ascorbic Acid (VITAMIN C) 500 MG CAPS Take 500 mg by mouth daily 90 capsule 3    calcium citrate-vitamin D (CITRACAL) 200-6.25 MG-MCG TABS per tablet Take 1 tablet by mouth 4 times daily Calcium Citrate at 1000 MG Total - 77%  Vitamin D at 2000 international unit(s) - 250%      cholecalciferol (VITAMIN D3) 25 mcg (1000 units) capsule Take 1 capsule (25 mcg) by mouth daily 90 capsule 3    fish oil-omega-3 fatty acids 1000 MG capsule Take 2 capsules (2 g) by mouth daily 180 capsule 3    fluticasone (FLONASE) 50 MCG/ACT nasal spray Spray 1 spray into both nostrils daily as needed for rhinitis or allergies 11.1 mL 2    lisinopril (ZESTRIL) 5 MG tablet Take 1 tablet (5 mg) by mouth daily 90 tablet 2    meloxicam (MOBIC) 7.5 MG tablet Take 1 tablet (7.5 mg) by mouth daily as needed for moderate pain 90 tablet 1    methocarbamol (ROBAXIN) 500 MG tablet Take 1 tablet (500 mg) by mouth 4 times daily as needed for muscle spasms      multivitamin w/minerals  (THERA-VIT-M) tablet Take 1 tablet by mouth daily Generic 90 tablet 3    omeprazole (PRILOSEC) 40 MG DR capsule Take 1 capsule (40 mg) by mouth daily 90 capsule 3    simvastatin (ZOCOR) 20 MG tablet Take 1 tablet (20 mg) by mouth at bedtime 90 tablet 3     No current facility-administered medications for this visit.      Past Medical History:   Patient Active Problem List   Diagnosis    Need for Tdap vaccination    Hyperlipidemia, unspecified hyperlipidemia type    Actinic keratosis    Spondylosis of lumbar spine    Osteopenia of neck of left femur    Lumbar radiculopathy, chronic    Primary hypertension     Past Medical History:   Diagnosis Date    Actinic keratosis     Gastroesophageal reflux disease without esophagitis     HLD (hyperlipidemia)     HTN (hypertension)     Spondylosis of lumbosacral spine with radiculopathy        CC Fredi Gutierrez MD  [address] [place] [address] on close of this encounter.

## 2024-05-07 ENCOUNTER — ANCILLARY PROCEDURE (OUTPATIENT)
Dept: BONE DENSITY | Facility: CLINIC | Age: 79
End: 2024-05-07
Attending: HOSPITALIST
Payer: MEDICARE

## 2024-05-07 ENCOUNTER — LAB (OUTPATIENT)
Dept: LAB | Facility: CLINIC | Age: 79
End: 2024-05-07
Payer: MEDICARE

## 2024-05-07 DIAGNOSIS — M85.852 OSTEOPENIA OF NECK OF LEFT FEMUR: ICD-10-CM

## 2024-05-07 DIAGNOSIS — R53.83 TIRED: ICD-10-CM

## 2024-05-07 LAB
ERYTHROCYTE [DISTWIDTH] IN BLOOD BY AUTOMATED COUNT: 12.3 % (ref 10–15)
HCT VFR BLD AUTO: 38.8 % (ref 35–47)
HGB BLD-MCNC: 13.8 G/DL (ref 11.7–15.7)
MCH RBC QN AUTO: 30.1 PG (ref 26.5–33)
MCHC RBC AUTO-ENTMCNC: 35.6 G/DL (ref 31.5–36.5)
MCV RBC AUTO: 85 FL (ref 78–100)
PLATELET # BLD AUTO: 232 10E3/UL (ref 150–450)
RBC # BLD AUTO: 4.59 10E6/UL (ref 3.8–5.2)
TSH SERPL DL<=0.005 MIU/L-ACNC: 1.47 UIU/ML (ref 0.3–4.2)
WBC # BLD AUTO: 5.4 10E3/UL (ref 4–11)

## 2024-05-07 PROCEDURE — 85027 COMPLETE CBC AUTOMATED: CPT | Performed by: PATHOLOGY

## 2024-05-07 PROCEDURE — 84443 ASSAY THYROID STIM HORMONE: CPT | Performed by: PATHOLOGY

## 2024-05-07 PROCEDURE — 36415 COLL VENOUS BLD VENIPUNCTURE: CPT | Performed by: PATHOLOGY

## 2024-05-07 PROCEDURE — 77080 DXA BONE DENSITY AXIAL: CPT | Performed by: INTERNAL MEDICINE

## 2024-05-07 PROCEDURE — 77081 DXA BONE DENSITY APPENDICULR: CPT | Mod: XU | Performed by: INTERNAL MEDICINE

## 2024-05-10 ENCOUNTER — MYC MEDICAL ADVICE (OUTPATIENT)
Dept: INTERNAL MEDICINE | Facility: CLINIC | Age: 79
End: 2024-05-10
Payer: MEDICARE

## 2024-06-03 ENCOUNTER — LAB (OUTPATIENT)
Dept: LAB | Facility: CLINIC | Age: 79
End: 2024-06-03
Payer: MEDICARE

## 2024-06-03 DIAGNOSIS — M85.852 OSTEOPENIA OF NECK OF LEFT FEMUR: Primary | ICD-10-CM

## 2024-06-03 DIAGNOSIS — M85.852 OSTEOPENIA OF NECK OF LEFT FEMUR: ICD-10-CM

## 2024-06-03 DIAGNOSIS — E78.5 HYPERLIPIDEMIA, UNSPECIFIED HYPERLIPIDEMIA TYPE: ICD-10-CM

## 2024-06-03 LAB
ALBUMIN SERPL BCG-MCNC: 4.4 G/DL (ref 3.5–5.2)
ALP SERPL-CCNC: 67 U/L (ref 40–150)
ALT SERPL W P-5'-P-CCNC: 19 U/L (ref 0–50)
ANION GAP SERPL CALCULATED.3IONS-SCNC: 9 MMOL/L (ref 7–15)
AST SERPL W P-5'-P-CCNC: 24 U/L (ref 0–45)
BILIRUB SERPL-MCNC: 0.4 MG/DL
BUN SERPL-MCNC: 11.9 MG/DL (ref 8–23)
CALCIUM SERPL-MCNC: 9.7 MG/DL (ref 8.8–10.2)
CHLORIDE SERPL-SCNC: 102 MMOL/L (ref 98–107)
CHOLEST SERPL-MCNC: 176 MG/DL
CREAT SERPL-MCNC: 0.73 MG/DL (ref 0.51–0.95)
DEPRECATED HCO3 PLAS-SCNC: 25 MMOL/L (ref 22–29)
EGFRCR SERPLBLD CKD-EPI 2021: 84 ML/MIN/1.73M2
FASTING STATUS PATIENT QL REPORTED: YES
FASTING STATUS PATIENT QL REPORTED: YES
GLUCOSE SERPL-MCNC: 99 MG/DL (ref 70–99)
HDLC SERPL-MCNC: 55 MG/DL
LDLC SERPL CALC-MCNC: 101 MG/DL
NONHDLC SERPL-MCNC: 121 MG/DL
POTASSIUM SERPL-SCNC: 4.4 MMOL/L (ref 3.4–5.3)
PROT SERPL-MCNC: 7 G/DL (ref 6.4–8.3)
SODIUM SERPL-SCNC: 136 MMOL/L (ref 135–145)
TRIGL SERPL-MCNC: 99 MG/DL
VIT D+METAB SERPL-MCNC: 100 NG/ML (ref 20–50)

## 2024-06-03 PROCEDURE — 36415 COLL VENOUS BLD VENIPUNCTURE: CPT | Performed by: PATHOLOGY

## 2024-06-03 PROCEDURE — 82306 VITAMIN D 25 HYDROXY: CPT | Performed by: HOSPITALIST

## 2024-06-03 PROCEDURE — 80061 LIPID PANEL: CPT | Performed by: PATHOLOGY

## 2024-06-03 PROCEDURE — 99000 SPECIMEN HANDLING OFFICE-LAB: CPT | Performed by: PATHOLOGY

## 2024-06-03 PROCEDURE — 80053 COMPREHEN METABOLIC PANEL: CPT | Performed by: PATHOLOGY

## 2024-06-10 ENCOUNTER — OFFICE VISIT (OUTPATIENT)
Dept: INTERNAL MEDICINE | Facility: CLINIC | Age: 79
End: 2024-06-10
Payer: MEDICARE

## 2024-06-10 VITALS
SYSTOLIC BLOOD PRESSURE: 134 MMHG | OXYGEN SATURATION: 98 % | WEIGHT: 147.6 LBS | HEART RATE: 77 BPM | BODY MASS INDEX: 30.32 KG/M2 | DIASTOLIC BLOOD PRESSURE: 84 MMHG

## 2024-06-10 DIAGNOSIS — J40 BRONCHITIS: Primary | ICD-10-CM

## 2024-06-10 PROCEDURE — 99214 OFFICE O/P EST MOD 30 MIN: CPT | Performed by: INTERNAL MEDICINE

## 2024-06-10 RX ORDER — BUDESONIDE AND FORMOTEROL FUMARATE DIHYDRATE 80; 4.5 UG/1; UG/1
2 AEROSOL RESPIRATORY (INHALATION) 2 TIMES DAILY
Qty: 10.2 G | Refills: 3 | Status: SHIPPED | OUTPATIENT
Start: 2024-06-10

## 2024-06-10 RX ORDER — MONTELUKAST SODIUM 10 MG/1
10 TABLET ORAL AT BEDTIME
Qty: 30 TABLET | Refills: 0 | Status: SHIPPED | OUTPATIENT
Start: 2024-06-10 | End: 2024-07-09

## 2024-06-10 NOTE — PROGRESS NOTES
Ericka is a 78 year old that presents in clinic today for the following:     Chief Complaint   Patient presents with    Cough     Pt reports dry cough began about two weeks ago and has worsened in the last several days. Pt having trouble sleeping due to cough. Pt denies fevers, chills, body aches    Musculoskeletal Problem     Pt reports soreness in wrists and numbness in fingers that began 2 weeks ago, pt has been using wrist braces at night that seem to help.            6/10/2024     4:54 PM   Additional Questions   Roomed by [facility] EMT   Accompanied by  Deloris     Dalia from encounters over the past 10 days    No data recorded       Jass Arreola at 4:57 PM on 6/10/2024    Answers submitted by the patient for this visit:  General Questionnaire (Submitted on 6/10/2024)  Chief Complaint: Chronic problems general questions HPI Form  How many servings of fruits and vegetables do you eat daily?: 4 or more  On average, how many sweetened beverages do you drink each day (Examples: soda, juice, sweet tea, etc.  Do NOT count diet or artificially sweetened beverages)?: 0  How many minutes a day do you exercise enough to make your heart beat faster?: 20 to 29  How many days a week do you exercise enough to make your heart beat faster?: 4  How many days per week do you miss taking your medication?: 0  General Concern (Submitted on 6/10/2024)  Chief Complaint: Chronic problems general questions HPI Form  What is the reason for your visit today?: cough and wrist problems  When did your symptoms begin?: 3-7 days ago  What are your symptoms?: carpal tunnel?? dry cough  How would you describe these symptoms?: Mild  Are your symptoms:: Staying the same  Have you had these symptoms before?: Yes  Have you tried or received treatment for these symptoms before?: Yes  Did that treatment work? : No  Is there anything that makes you feel better?: wear wrist braces at night

## 2024-06-10 NOTE — PROGRESS NOTES
HPI  78-year-old presents today for a couple reasons.  She reports a 2-week history of cough.  This started spontaneously is not related to any previous infection or allergen or other exposure that she is aware of.  She reports a dry nonproductive cough somewhat worse at night when she lays down bothers her .  No associated sputum production fever chills sweats shortness of breath exercise intolerance or definitive aggravating or alleviating factors.  She has not had any past history of similar cough.  She does have a history of allergies is scheduled for upcoming allergy testing and uses the Flonase inhaler regularly however there is been no associated rhinorrhea postnasal drip sinusitis or other upper respiratory symptoms.  There is no PND orthopnea and no peripheral edema associated with this as well.  She is also noted for several weeks numbness and tingling in her hands.  This seems to be worse in the area of the thumb and may spare the little finger by her report.  Bothers her at night and is relieved by wearing wrist splints.  She is not had any injury or trauma in relation to this.  Because of a history of arthritis and a previous fracture of the right wrist.  Past Medical History:   Diagnosis Date    Actinic keratosis     Gastroesophageal reflux disease without esophagitis     HLD (hyperlipidemia)     HTN (hypertension)     Spondylosis of lumbosacral spine with radiculopathy      Past Surgical History:   Procedure Laterality Date    APPENDECTOMY       SECTION      x3    INJECT EPIDURAL LUMBAR Right 2024    Procedure: INJECTION, SPINE, LUMBAR, EPIDURAL;  Surgeon: Omid Escobar MD;  Location: [facility] OR    IR LUMBAR EPIDURAL STEROID INJECTION Bilateral 2023    L4-5    IR LUMBAR EPIDURAL STEROID INJECTION Bilateral 2023    L4-5    TONSILLECTOMY      XR WRIST SURGERY JEFF RIGHT      with pins     Family History   Problem Relation Age of Onset    Heart Failure Mother     Ovarian  Cancer Mother     Ulcers Father     Rheumatoid Arthritis Father     Myocardial Infarction Father     Breast Cancer Maternal Grandmother     Skin Cancer No family hx of     Melanoma No family hx of          Exam:  /84 (BP Location: Right arm, Patient Position: Sitting, Cuff Size: Adult Regular)   Pulse 77   Wt 67 kg (147 lb 9.6 oz)   SpO2 98%   BMI 30.32 kg/m    147 lbs 9.6 oz  PHYSICAL EXAMINATION:   The patient is alert, oriented with a clear sensorium.   Skin shows no lesions or rashes and good turgor.   Head is normocephalic and atraumatic. .   Ears show cerumen bilaterally.   Mouth shows clear oral mucosa.   Neck shows no nodes, thyromegaly or bruits.    Lungs are clear to percussion and auscultation.   Heart shows normal S1 and S2 without murmur or gallop.   Extremities show no edema and no evidence of active synovitis.  Tinel's and Phalen's test are negative at the wrist.  She may have differential sensation for finger versus first finger.  There is evidence of significant osteoarthritis of the hands and wrists.    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  If no improvement or resolution in a week will proceed with chest x-ray blood work and pulmonary function studies.  For the apparent carpal tunnel whenever continue with the wrist splints I gave her an exercise regimen for this if no resolution of this over the next several weeks we will arrange for an EMG.    Over 30 minutes spent on the day of service in chart review, patient contact, record completion and review and notification of lab reports    This note was completed using Dragon voice recognition software.      Anthony Dietz MD  General Internal Medicine  Primary Care  [place]  [phone]

## 2024-06-13 ENCOUNTER — TELEPHONE (OUTPATIENT)
Dept: OPHTHALMOLOGY | Facility: CLINIC | Age: 79
End: 2024-06-13
Payer: MEDICARE

## 2024-06-18 ENCOUNTER — OFFICE VISIT (OUTPATIENT)
Dept: OPHTHALMOLOGY | Facility: CLINIC | Age: 79
End: 2024-06-18
Payer: MEDICARE

## 2024-06-18 ENCOUNTER — PRE VISIT (OUTPATIENT)
Dept: OPHTHALMOLOGY | Facility: CLINIC | Age: 79
End: 2024-06-18

## 2024-06-18 DIAGNOSIS — H25.13 NUCLEAR SCLEROTIC CATARACT OF BOTH EYES: ICD-10-CM

## 2024-06-18 DIAGNOSIS — H52.13 MYOPIC ASTIGMATISM OF BOTH EYES: ICD-10-CM

## 2024-06-18 DIAGNOSIS — H52.4 PRESBYOPIA OF BOTH EYES: ICD-10-CM

## 2024-06-18 DIAGNOSIS — H52.203 MYOPIC ASTIGMATISM OF BOTH EYES: ICD-10-CM

## 2024-06-18 DIAGNOSIS — H40.003 GLAUCOMA SUSPECT OF BOTH EYES: Primary | ICD-10-CM

## 2024-06-18 PROCEDURE — 92133 CPTRZD OPH DX IMG PST SGM ON: CPT | Performed by: OPHTHALMOLOGY

## 2024-06-18 PROCEDURE — 99204 OFFICE O/P NEW MOD 45 MIN: CPT | Performed by: OPHTHALMOLOGY

## 2024-06-18 PROCEDURE — 92015 DETERMINE REFRACTIVE STATE: CPT | Mod: GY | Performed by: OPHTHALMOLOGY

## 2024-06-18 ASSESSMENT — VISUAL ACUITY
OS_CC: J1+
OD_CC: J1+
METHOD: SNELLEN - LINEAR
OS_CC+: -1
OS_CC: 20/20
CORRECTION_TYPE: GLASSES
OD_CC+: -1
OD_CC: 20/20

## 2024-06-18 ASSESSMENT — CONF VISUAL FIELD
OD_NORMAL: 1
OS_INFERIOR_TEMPORAL_RESTRICTION: 0
OS_INFERIOR_NASAL_RESTRICTION: 0
METHOD: COUNTING FINGERS
OD_SUPERIOR_NASAL_RESTRICTION: 0
OD_INFERIOR_NASAL_RESTRICTION: 0
OS_SUPERIOR_TEMPORAL_RESTRICTION: 0
OS_NORMAL: 1
OS_SUPERIOR_NASAL_RESTRICTION: 0
OD_INFERIOR_TEMPORAL_RESTRICTION: 0
OD_SUPERIOR_TEMPORAL_RESTRICTION: 0

## 2024-06-18 ASSESSMENT — CUP TO DISC RATIO
OS_RATIO: 0.45
OD_RATIO: 0.55

## 2024-06-18 ASSESSMENT — REFRACTION_MANIFEST
OS_ADD: +2.75
OD_ADD: +2.75
OS_AXIS: 003
OS_SPHERE: -1.75
OD_AXIS: 005
OS_CYLINDER: +2.25
OD_SPHERE: -1.25
OD_CYLINDER: +3.00

## 2024-06-18 ASSESSMENT — REFRACTION_WEARINGRX
OD_CYLINDER: +3.00
OS_AXIS: 178
OS_ADD: +2.75
OS_SPHERE: -1.75
OD_AXIS: 012
SPECS_TYPE: TRIFOCAL
OS_CYLINDER: +3.25
OD_SPHERE: -1.75
OD_ADD: +2.75

## 2024-06-18 ASSESSMENT — EXTERNAL EXAM - LEFT EYE: OS_EXAM: NORMAL

## 2024-06-18 ASSESSMENT — TONOMETRY
OD_IOP_MMHG: 22
OS_IOP_MMHG: 22
IOP_METHOD: APPLANATION

## 2024-06-18 ASSESSMENT — SLIT LAMP EXAM - LIDS
COMMENTS: NORMAL
COMMENTS: NORMAL

## 2024-06-18 ASSESSMENT — EXTERNAL EXAM - RIGHT EYE: OD_EXAM: NORMAL

## 2024-06-18 NOTE — PROGRESS NOTES
HPI  Ericka Lyman is a 78 year old female here for comprehensive eye exam.  She recently moved from [place], where she was followed for open angle glaucoma suspicion and mild cataracts.    HPI       COMPREHENSIVE EYE EXAM    In both eyes.  Associated symptoms include dryness.  Negative for eye pain.  Treatments tried include eye drops.  Pain was noted as 0/10. Additional comments: Comprehensive exam              Comments    Establishing eye care after moving from [place]. Previous eye care provide was watching for Cataracts and Glaucoma ( Suspect right eye)   Can still see to thread a needle.  Distance vision seems Ok with glasses each eye.   Dryness with each eye but manages fine with lubricants PRN each eye.         GABRIELLE Hdz COT 10:55 AM June 18, 2024               Last edited by Shalonda Morris COT on 6/18/2024 10:55 AM.             PMH:   Past Medical History:   Diagnosis Date    Actinic keratosis     Gastroesophageal reflux disease without esophagitis     HLD (hyperlipidemia)     HTN (hypertension)     Spondylosis of lumbosacral spine with radiculopathy       POH: Glasses for myopia, glaucoma suspect based on optic disc cupping asymmetry, cataracts, no surgery, no trauma  Oc Meds: artificial tear drops as needed   FH: mother with glaucoma secondary to trauma, denies any glaucoma, age related macular degeneration, or other known eye diseases         Assessment & Plan     1. Glaucoma suspect of both eyes - Both Eyes    2. Nuclear sclerotic cataract of both eyes - Both Eyes    3. Myopic astigmatism of both eyes - Both Eyes    4. Presbyopia of both eyes - Both Eyes      (H40.003) Glaucoma suspect of both eyes - Both Eyes  (primary encounter diagnosis)  Comment:  optic disc cupping asymmetry   K pachy:   repeat   Tmax:  20s per old notes     HVF: normal outside clinic     CDR:  0.55/0.45  HRT/OCT:  borderline inferior segment right eye, needs repeat os      FHX of Glc:  mother (traumatic?)     Gonio:  open      Intolerant to: Asthma/COPD:    Steroid Use:  no     Kidney Stones:       Sulfa Allergy:  no   IOP targets: low 20s  Today's IOP: 22/22      Plan: reviewed outside records from [facility] [place]  OCT retinal nerve fiber layer - borderline thinning versus artifact right eye, left eye poor scan- repeat  continue observation off drops   Visual field, pachymetry, and gonioscopy at future visit    (H52.13) Myopia, bilateral - Both Eyes   Comment: mild changes from current glasses  With astigmatism    Plan: Refraction done and prescription for glasses given, update as needed only     (H25.13) Nuclear sclerotic cataract of both eyes - Both Eyes  Comment: early, not visually significant, counseled patient that it could cause mild glare/halos and refractive changes over time that can be compensated with new glasses   Plan: new prescription given, observe    -----------------------------------------------------------------------------------       Patient disposition:   Return in about 1 year (around 6/18/2025) for Comprehensive Exam, OCT nerve (RNFL) OU, Octopus 24-2. or patient to call sooner as needed.      Complete documentation of historical and exam elements from today's encounter can be found in the full encounter summary report (not reduplicated in this progress note). I personally obtained the chief complaint(s) and history of present illness.  I have confirmed and edited as necessary the CC, HPI, PMH/PSH, social history, FMH, ROS, and exam/neuro findings as obtained by the technician or others. I have examined this patient myself and I personally viewed the image(s) and studies listed above and the documentation reflects my findings and interpretation.  I formulated and edited as necessary the assessment and plan and discussed the findings and management plan with the patient and family.     Carrie Weathers MD

## 2024-06-18 NOTE — NURSING NOTE
Chief Complaints and History of Present Illnesses   Patient presents with    COMPREHENSIVE EYE EXAM     Comprehensive exam      Chief Complaint(s) and History of Present Illness(es)       COMPREHENSIVE EYE EXAM              Laterality: both eyes    Associated symptoms: dryness.  Negative for eye pain    Treatments tried: eye drops    Pain scale: 0/10    Comments: Comprehensive exam               Comments    Establishing eye care after moving from [place]. Previous eye care provide was watching for Cataracts and Glaucoma ( Suspect right eye)   Can still see to thread a needle.  Distance vision seems Ok with glasses each eye.   Dryness with each eye but manages fine with lubricants PRN each eye.         Shalonda Morris, COT COT 10:55 AM June 18, 2024

## 2024-06-25 DIAGNOSIS — M47.816 SPONDYLOSIS OF LUMBAR SPINE: Primary | ICD-10-CM

## 2024-07-03 DIAGNOSIS — J40 BRONCHITIS: ICD-10-CM

## 2024-07-04 RX ORDER — MELOXICAM 7.5 MG/1
7.5 TABLET ORAL DAILY PRN
Qty: 90 TABLET | Refills: 1 | Status: SHIPPED | OUTPATIENT
Start: 2024-07-04

## 2024-07-09 RX ORDER — MONTELUKAST SODIUM 10 MG/1
1 TABLET ORAL AT BEDTIME
Qty: 30 TABLET | OUTPATIENT
Start: 2024-07-09

## 2024-07-09 RX ORDER — MONTELUKAST SODIUM 10 MG/1
10 TABLET ORAL AT BEDTIME
Qty: 30 TABLET | Refills: 0 | Status: SHIPPED | OUTPATIENT
Start: 2024-07-09

## 2024-07-09 NOTE — TELEPHONE ENCOUNTER
I'll decline refill of montelukast from me. Will defer to Dr. Dietz, as was the originating prescriber. If patient continues with symptoms, she should follow back in clinic.     Fredi Gutierrez MD  Internal Medicine  Primary Care Clinic, [place], [place]

## 2024-07-23 ENCOUNTER — TELEPHONE (OUTPATIENT)
Dept: ANESTHESIOLOGY | Facility: CLINIC | Age: 79
End: 2024-07-23
Payer: MEDICARE

## 2024-07-23 DIAGNOSIS — M54.16 LUMBAR RADICULOPATHY: Primary | ICD-10-CM

## 2024-07-23 RX ORDER — DIAZEPAM 5 MG
5-10 TABLET ORAL
Status: CANCELLED | OUTPATIENT
Start: 2024-07-23

## 2024-07-23 NOTE — TELEPHONE ENCOUNTER
Called patient to schedule procedure with Dr. Escobar    Date of Procedure: 8/22/24    Arrival time given: Yes: Arrival Time 1pm       Procedure Location: [facility] [place]     Verified Location with Patient:  Yes  Address provided to the patient     Pre-op H&P Required:  No: Local anesthesia        Post-Op/Follow Up Appt:  Not Indicated in Request      Informed patient they will need a  to drive them home:  Yes    Patients : Spouse     Patient is aware that pre-op RN from the procedure center will call 2-3 days prior to scheduled procedure to confirm arrival time and review any instructions:  Yes       Additional Comments: N/A        Kyleigh Villafana MA on 7/23/2024 at 3:53 PM      P: [phone]*

## 2024-07-24 NOTE — TELEPHONE ENCOUNTER
RN reviewed patient chart. Pre procedure instructions were sent to the patient.    Audrey Cohen RNCC

## 2024-08-16 ENCOUNTER — LAB (OUTPATIENT)
Dept: LAB | Facility: CLINIC | Age: 79
End: 2024-08-16
Payer: MEDICARE

## 2024-08-16 DIAGNOSIS — M85.852 OSTEOPENIA OF NECK OF LEFT FEMUR: ICD-10-CM

## 2024-08-16 LAB — VIT D+METAB SERPL-MCNC: 65 NG/ML (ref 20–50)

## 2024-08-16 PROCEDURE — 99000 SPECIMEN HANDLING OFFICE-LAB: CPT | Performed by: PATHOLOGY

## 2024-08-16 PROCEDURE — 36415 COLL VENOUS BLD VENIPUNCTURE: CPT | Performed by: PATHOLOGY

## 2024-08-16 PROCEDURE — 82306 VITAMIN D 25 HYDROXY: CPT | Performed by: HOSPITALIST

## 2024-08-22 ENCOUNTER — ANCILLARY PROCEDURE (OUTPATIENT)
Dept: RADIOLOGY | Facility: AMBULATORY SURGERY CENTER | Age: 79
End: 2024-08-22
Attending: ANESTHESIOLOGY
Payer: MEDICARE

## 2024-08-22 ENCOUNTER — HOSPITAL ENCOUNTER (OUTPATIENT)
Facility: AMBULATORY SURGERY CENTER | Age: 79
Discharge: HOME | End: 2024-08-22
Attending: ANESTHESIOLOGY | Admitting: ANESTHESIOLOGY
Payer: MEDICARE

## 2024-08-22 VITALS
TEMPERATURE: 97.8 F | OXYGEN SATURATION: 99 % | WEIGHT: 144 LBS | HEIGHT: 59 IN | RESPIRATION RATE: 14 BRPM | SYSTOLIC BLOOD PRESSURE: 198 MMHG | HEART RATE: 74 BPM | BODY MASS INDEX: 29.03 KG/M2 | DIASTOLIC BLOOD PRESSURE: 97 MMHG

## 2024-08-22 DIAGNOSIS — M54.16 LUMBAR RADICULOPATHY: ICD-10-CM

## 2024-08-22 PROCEDURE — 62323 NJX INTERLAMINAR LMBR/SAC: CPT

## 2024-08-22 RX ORDER — BUPIVACAINE HYDROCHLORIDE 2.5 MG/ML
INJECTION, SOLUTION EPIDURAL; INFILTRATION; INTRACAUDAL PRN
Status: DISCONTINUED | OUTPATIENT
Start: 2024-08-22 | End: 2024-08-22 | Stop reason: HOSPADM

## 2024-08-22 RX ORDER — METHYLPREDNISOLONE ACETATE 40 MG/ML
INJECTION, SUSPENSION INTRA-ARTICULAR; INTRALESIONAL; INTRAMUSCULAR; SOFT TISSUE PRN
Status: DISCONTINUED | OUTPATIENT
Start: 2024-08-22 | End: 2024-08-22 | Stop reason: HOSPADM

## 2024-08-22 RX ORDER — DIAZEPAM 5 MG
5-10 TABLET ORAL
Status: DISCONTINUED | OUTPATIENT
Start: 2024-08-22 | End: 2024-08-23 | Stop reason: HOSPADM

## 2024-08-22 RX ORDER — LIDOCAINE HYDROCHLORIDE 10 MG/ML
INJECTION, SOLUTION EPIDURAL; INFILTRATION; INTRACAUDAL; PERINEURAL PRN
Status: DISCONTINUED | OUTPATIENT
Start: 2024-08-22 | End: 2024-08-22 | Stop reason: HOSPADM

## 2024-08-22 RX ORDER — IOPAMIDOL 408 MG/ML
INJECTION, SOLUTION INTRATHECAL PRN
Status: DISCONTINUED | OUTPATIENT
Start: 2024-08-22 | End: 2024-08-22 | Stop reason: HOSPADM

## 2024-08-22 NOTE — OP NOTE
Patient: Ericka Lyman Age: 78 year old   MRN: [id number] Attending: Dr. Escobar      Date of Visit: February 8, 2024        PAIN MEDICINE CLINIC PROCEDURE NOTE     ATTENDING CLINICIAN:    Omid Escobar MD     ASSISTANT CLINICIAN:  Cruzito Alvarez DO       PREPROCEDURE DIAGNOSES:  1.  Lumbar radiculopathy  2.  Chronic low back pain         PROCEDURE(S) PERFORMED:  1.  Interlaminar lumbar epidural steroid injection (left L5-S1)  2.  Fluoroscopic guidance for the above-named procedure(s)        ANESTHESIA:  Local.     INDICATIONS:  Ericka Lyman is a 78 year old female with a history of  chronic low back pain secondary to bilateral lumbosacral radiculopathy (left greater than the right).  The patient stated that the patient was in their usual state of health and denied recent anticoagulant use or recent infections.  Therefore, the plan is to perform above mentioned procedures.      Procedure Details:  The patient was met in the procedure room, where the patient was identified by name, medical record number and date of birth.  All of the patient s last minute questions were answered. Written informed consent was obtained and saved in the electronic medical record, after the risks, benefits, and alternatives were discussed with the patient.       A formal time-out procedure was performed, as per protocol, including patient name, title of procedure, and site of procedure, and all in the room concurred.  Routine monitors were applied.       The patient was placed in the prone position on the procedure room table.  All pressure points were checked and comfortably padded.  Routine monitors were placed.  Vital signs were stable.     A chlorhexidine prep was completed followed by sterile draping per standard procedure.      The AP fluoroscopic view was optimized for approach at left L5-S1 interspace.  The fluoroscope was obliqued to the 10 degree ipsilateral and 5 degree caudal to get optimum view.     The skin over the  interspace was infiltrated with 4-5 mL of 1% Lidocaine using a 25 gauge, 1.5 inch needle.  A 20-gauge 3-1/2 inch Tuohy needle was advanced under fluoroscopic guidance with left paramedial approach until it touched lamina. Mutiple AP and lateral fluoroscopic images at this time  are taken as Tuohy needle was advanced to the epidural space. The epidural space was identified, without evidence of blood, cerebrospinal fluid, or parasthesia throughout. Needle tip placement within the epidural space was further confirmed with 1-2 mL of nonionic contrast agent, with the epidural space visualized in the AP and lateral fluoroscopic view(s) with appropriate spread of the agent with fat vacuolization and no intravascular or intrathecal spread noted. Next, 8 mL of a treatment solution containing 2 mL of preservative free 0.25% bupivacaine, 1 mL of Depo-Medrol 40 mg/mL and 5 mL preservative free normal saline was administered slowly. The needle was withdrawn.        Light pressure was held at the puncture site(s) to prevent ecchymosis and oozing.  The patient's skin was cleansed, and hemostasis was confirmed.  Band-aids were applied to the needle injection site(s).       Condition:     The patient remained awake and alert throughout the procedure.  The patient tolerated the procedure well and was monitored for approximately 15 minutes afterward in the post procedure area.  There were no immediate post procedure complications noted.  The patient was then discharged to home as per protocol.

## 2024-08-22 NOTE — DISCHARGE INSTRUCTIONS
Home Care Instructions after an Epidural Steroid Pain Injection  A lumbar epidural steroid injection delivers steroid medication directly into the area that may be causing your lower back pain and/or leg pain. A cervical or thoracic epidural steroid injection delivers steroids into the epidural space surrounding spinal nerve roots to help relieve pain in the upper spine/neck.  Activity  -Rest today  -Do not work today  -Resume normal activity tomorrow  -DO NOT shower for 24 hours  -DO NOT remove bandaid for 24 hours    Pain  -You may experience soreness at the injection site for one or two days  -You may use an ice pack for 20 minutes every 2 hours for the first 24 hours  -You may use a heating pad after the first 24 hours  -You may use Tylenol (acetaminophen) every 4 hours or other pain medicines as     directed by your physician    You may experience numbness radiating into your legs or arms (depending on the procedure location). This numbness may last several hours. Until sensation returns to normal; please use caution in walking, climbing stairs, and stepping out of your vehicle, etc.  Common side effects of steroids:  Not everyone will experience corticosteroid side effects. If side effects are experienced, they will gradually subside in the 7-10 day period following an injection. Most common side effects include:  -Flushed face and/or chest  -Feeling of warmth, particularly in the face but could be an overall feeling of warmth  -Increased blood sugar in diabetic patients  -Menstrual irregularities my occur. If taking hormone-based birth control an alternate method of birth control is recommended  -Sleep disturbances and/or mood swings are possible  -Leg cramps    Please contact us if you have:  -Severe pain  -Fever more than 101.5 degrees Fahrenheit  -Signs of infection at the injection site (redness, swelling, or drainage)    FOR PAIN CENTER PATIENTS:  If you have questions, please contact the Pain Clinic at  [phone] Option #1 between the hours of 7:00 am and 3:00 pm Monday through Friday. After office hours you can contact the on call provider by dialing [phone]. If you need immediate attention, we recommend that you go to a hospital emergency room or dial [phone].      FOR PM&R PATIENTS:  For patients seen by the PM and R service, please call [phone]. (Monday through Friday 8a-5p.  After business hours and weekends call [phone] and ask for the PM and R doctor on call). If you need to fax a pain diary to PM&R the fax number is [phone]. If you are unable to fax, uploading to [employer] is encouraged, then send to provider. If you have procedure scheduling questions please call [phone] Option #2.

## 2024-08-27 NOTE — TELEPHONE ENCOUNTER
FUTURE VISIT INFORMATION      FUTURE VISIT INFORMATION:  Date: 9/4/24  Time: 9:00 am  Location: [facility]  REFERRAL INFORMATION:  Referring provider:  Hitesh De La Garza MD   Referring providers clinic[facility]  [facility]  Reason for visit/diagnosis:  T78.40XA (ICD-10-CM) - Allergic reaction, initial encounter     RECORDS REQUESTED FROM:       Clinic name Comments Records Status Imaging Status   [facility] [address] [facility] 2/13/24 - Pravin FAIR

## 2024-09-03 PROBLEM — M85.852 OSTEOPENIA OF NECK OF LEFT FEMUR: Status: RESOLVED | Noted: 2023-12-11 | Resolved: 2024-09-03

## 2024-09-04 ENCOUNTER — PRE VISIT (OUTPATIENT)
Dept: ALLERGY | Facility: CLINIC | Age: 79
End: 2024-09-04

## 2024-09-04 ENCOUNTER — TELEPHONE (OUTPATIENT)
Dept: INTERNAL MEDICINE | Facility: CLINIC | Age: 79
End: 2024-09-04

## 2024-09-04 ENCOUNTER — OFFICE VISIT (OUTPATIENT)
Dept: ALLERGY | Facility: CLINIC | Age: 79
End: 2024-09-04
Payer: MEDICARE

## 2024-09-04 ENCOUNTER — DOCUMENTATION ONLY (OUTPATIENT)
Dept: AUDIOLOGY | Facility: CLINIC | Age: 79
End: 2024-09-04

## 2024-09-04 DIAGNOSIS — Z88.9 ATOPY: ICD-10-CM

## 2024-09-04 DIAGNOSIS — Z88.9 DRUG ALLERGY: Primary | ICD-10-CM

## 2024-09-04 DIAGNOSIS — T78.40XA ALLERGIC REACTION, INITIAL ENCOUNTER: ICD-10-CM

## 2024-09-04 DIAGNOSIS — J30.89 SEASONAL AND PERENNIAL ALLERGIC RHINOCONJUNCTIVITIS: ICD-10-CM

## 2024-09-04 DIAGNOSIS — H90.3 SENSORINEURAL HEARING LOSS (SNHL), BILATERAL: Primary | ICD-10-CM

## 2024-09-04 DIAGNOSIS — H10.10 SEASONAL AND PERENNIAL ALLERGIC RHINOCONJUNCTIVITIS: ICD-10-CM

## 2024-09-04 DIAGNOSIS — J30.2 SEASONAL AND PERENNIAL ALLERGIC RHINOCONJUNCTIVITIS: ICD-10-CM

## 2024-09-04 PROCEDURE — 95018 ALL TSTG PERQ&IQ DRUGS/BIOL: CPT | Performed by: DERMATOLOGY

## 2024-09-04 PROCEDURE — 99214 OFFICE O/P EST MOD 30 MIN: CPT | Mod: 25 | Performed by: DERMATOLOGY

## 2024-09-04 PROCEDURE — 95044 PATCH/APPLICATION TESTS: CPT | Performed by: DERMATOLOGY

## 2024-09-04 PROCEDURE — 95004 PERQ TESTS W/ALRGNC XTRCS: CPT | Performed by: DERMATOLOGY

## 2024-09-04 NOTE — PROGRESS NOTES
[facility] Dermato-allergology Note  Office visit  Encounter Date: Sep 4, 2024  ____________________________________________    CC: Allergy Consult (Possible Penicillin and/or amoxicillin allergy)      HPI:  (Sep 4, 2024)  Ms. Ericka Lyman is a(n) 78 year old female who presents today as new patient for allergy consultation  - Referred by Dr. Keaton Costello/Dr. Hitesh De La Garza () on 2/13/24 for possible penicillin and amoxicillin allergies  - Provider referral comment: unsure if real allergies and testing requested  - Penicillins  - Maybe 8 years ago (documented in Epic 2/1/19), prescribed penicillin for sinus infection  - No bumps or redness; no other symptoms  - Took several doses, was scratching all over  - Stopped taking it, and itching stopped  - Skin was not dry or scaly after  - But she is unsure that she is truly allergic, so she would like to be tested  - 12/11/23 entry in Epic allergy list for Augmentin and reaction was diarrhea  - Patient does not know why that was entered; only visit from that day was with internist Dr. Gutierrez (her first visit with him); she is scheduled to see him tomorrow   - Lisinopril  - Has been on 3 different medications for HTN, including lisinopril  - Developed coughing, but no lip swelling  - Currently on Cozaar and simvastatin  - Not currently on antihistamines; only takes medication for HTN and HLD, and currently using Flonase  - Otherwise feeling well in usual state of health    Physical Exam:  General: In no acute distress, well-developed, well-nourished  Eyes: no conjunctivitis  ENT: no signs of rhinitis   Pulmonary: no wheezing or coughing  Skin: Focused examination of the skin on test sites was performed = see test results below    Past Medical History:   Patient Active Problem List   Diagnosis    Need for Tdap vaccination    Hyperlipidemia, unspecified hyperlipidemia type    Actinic keratosis    Spondylosis of lumbar spine    Lumbar radiculopathy,  chronic    Primary hypertension    Lumbar radiculopathy     Past Medical History:   Diagnosis Date    Actinic keratosis     Gastroesophageal reflux disease without esophagitis     HLD (hyperlipidemia)     HTN (hypertension)     Osteoporosis     Spondylosis of lumbosacral spine with radiculopathy      Allergies:  No Active Allergies    Medications:  Current Outpatient Medications   Medication Sig Dispense Refill    Ascorbic Acid (VITAMIN C) 500 MG CAPS Take 500 mg by mouth daily 90 capsule 3    budesonide-formoterol (SYMBICORT) 80-4.5 MCG/ACT Inhaler Inhale 2 puffs into the lungs 2 times daily 10.2 g 3    calcium citrate-vitamin D (CITRACAL) 200-6.25 MG-MCG TABS per tablet Take 1 tablet by mouth 4 times daily Calcium Citrate at 1000 MG Total - 77%  Vitamin D at 2000 international unit(s) - 250%      fish oil-omega-3 fatty acids 1000 MG capsule Take 2 capsules (2 g) by mouth daily 180 capsule 3    fluticasone (FLONASE) 50 MCG/ACT nasal spray Spray 1 spray into both nostrils daily as needed for rhinitis or allergies 11.1 mL 2    losartan (COZAAR) 25 MG tablet Take 0.5 tablets (12.5 mg) by mouth daily 45 tablet 3    meloxicam (MOBIC) 7.5 MG tablet Take 1 tablet (7.5 mg) by mouth daily as needed for moderate pain 90 tablet 1    montelukast (SINGULAIR) 10 MG tablet Take 1 tablet (10 mg) by mouth at bedtime 30 tablet 0    multivitamin w/minerals (THERA-VIT-M) tablet Take 1 tablet by mouth daily Generic 90 tablet 3    omeprazole (PRILOSEC) 40 MG DR capsule Take 1 capsule (40 mg) by mouth daily 90 capsule 3    simvastatin (ZOCOR) 20 MG tablet Take 1 tablet (20 mg) by mouth at bedtime 90 tablet 3     No current facility-administered medications for this visit.     Family History:  Family History   Problem Relation Age of Onset    Glaucoma Mother     Heart Failure Mother     Ovarian Cancer Mother     Ulcers Father     Rheumatoid Arthritis Father     Myocardial Infarction Father     Breast Cancer Maternal Grandmother     Skin  Cancer No family hx of     Melanoma No family hx of      Previous Labs, Allergy Tests, Dermatopathology, Imaging:  [Upon review of Epic chart, no prior allergy records as of 9/4/24.]     2/13/24 Dr. Keaton Costello/Dr. Hitesh De La Garza ()  FROM [facility]:  Female with a PMHx of osteopenia, GERD, HLD who comes in for evaluation of high blood pressure. Patient was at her orthopedic appointment on 2/8/24 and was noted to have a blood pressure of 190/133. She also had other elevated readings above the normal. She checked her BP at home, which ranged from 131-148/81-93. She exercises and does not consume much salt in her diet.     Also, patient has a family history of breast cancer and asks to continue obtaining mammograms. We discussed the recommendations by the USPSTF and breast cancer foundation. Patient wants to continue mammograms.     Patient has a documented history of penicillins and amoxicillin-clavulanate. She asks for allergy testing.     Medications and allergies reviewed by me today.     FROM A&P:  Essential hypertension  Patient with SBP ranging from 130-148 and DBP from 81-93 at home. Also with clinic readings up to 190/133. Discussed continuation of healthy diet and exercise. Will start patient on low dose amlodipine. She will continue to monitor blood pressure at home and keep a journal. Patient instructed to message clinic with home BP readings after 2 weeks and amlodipine may be titrated as needed.   -     amLODIPine (NORVASC) 2.5 MG tablet; Take 1 tablet (2.5 mg) by mouth daily  -Home BP monitoring  -Healthy diet with reduced salt intake     Healthcare maintenance  Encounter for screening mammogram for malignant neoplasm of breast  -     *MA Screening Digital Bilateral; Future     Allergic reaction, initial encounter  -     Adult Allergy/Asthma  Referral; Future    Referred By: Hitesh De La Garza MD ()  [address] [address]     Allergy Tests:  Past Allergy Test -  reports 1 allergy test decades ago; believes weed pollen positive, but she is unsure; she remembers only being told she has hay fever    Social History:  The patient .... Patient has the following hobbies or non-occupational exposure: ....    Order for Future Allergy Testing:    [x] Outpatient  [] Inpatient: [facility]..../ Bed ...    Skin Atopy (atopic dermatitis)? [x] Yes     [] No  Comments: chronically dry skin; moisturizes regularly  Childhood eczema?   [] Yes     [] No  Comments:   Hand eczema?   [] Yes     [] No  If yes, leading hand? [] Right     [] Left     [] Ambidextrous  Comments:     Contact allergies?   [] Yes     [] No  Comments:   Including adhesives/bandages? [] Yes     [] No  Comments:   Including metals?   [] Yes     [] No  Comments:     Drug allergies?   [x] Yes     [] No  Comments: reason for visit - see HPI    Angioedema?   [] Yes     [] No  Comments:     Urticaria?   [] Yes     [] No  Comments:     Food allergies?   [] Yes     [] No  Comments:     Pet allergies?   [] Yes     [x] No  Comments: none at home and no reactions to them; pets a dog at a place she frequents, but no issues    Environmental allergy symptoms?  [] Conjunctivitis  [] Otitis  [] Pharyngitis  [] Polyposis  [] Postnasal Drip  [x] Rhinitis  [x] Sinusitis  [] None  Comments: takes Flonase daily summer to fall and it manages symptoms well    HENT Operations?  [] Adenoids  [] Septum  [] Sinus  [] Tonsils  [] Other:   [] None  Comments:     Pulmonary symptoms (from birth to present)?  [] Asthma bronchiale  Inhaler(s)?:   [x] Coughing  [] Other  [] None  Comments: had episode of coughing, saw doctor filling in for Dr. Gutierrez since he is only in clinic 2 days/week; rx inhaler, she used x 1 week, and then cough resolved, so she hasn't used inhaler since; had not stopped HTN meds at that time; denies H/O asthma prior to then    Environmental and pulmonary symptoms aggravated by?  Season: [] None     [] I     [] II     [x] III     [x] IV      [x] V     [x] VI          [x] VII     [x] VIII     [x] IX     [x] X     [] XI     [] XII     [] Perennial  Time of Day: [x] None     [] Morning     [] Noon     [] Evening     [] Night     [] Whole Day  Location/Changes: [x] None     [] Inside     [] Outside     [] Mountain     [] Sea     [] Other:   Triggers (specific): [x] None     [] Animals     [] Dust     [] Mold     [] Plants     [] Other:   Triggers (other): [x] None     [] Psyche     [] Sport     [] Work     [] Other:   Irritant: [x] None     [] Cold     [] Heat     [] Odors     [] Physical Efforts     [] Smoke     [] Other:     Order for PATCH TESTS  Reason for tests (suspected allergy): not necessary at this time  Known previous allergies: none  Standardized panels  [] Standard panel (40 tests)  [] Preservatives & Antimicrobials (31 tests)  [] Emulsifiers & Additives (25 tests)   [] Perfumes/Flavours & Plants (25 tests)  [] Hairdresser panel (12 tests)  [] Rubber Chemicals (22 tests)  [] Plastics (26 tests)  [] Colorants/Dyes/Food additives (20 tests)  [] Metals (implants/dental) (24 tests)  [] Local anaesthetics/NSAIDs (13 tests)  [] Antibiotics & Antimycotics (14 tests)   [] Corticosteroids (15 tests)   [] Photopatch test (62 tests)   [] Others:     [] Patient's Own Products:   DO NOT test if chemical or biological identity is unknown!   always ask from patient the product information and safety sheets (MSDS)       Order for PRICK TESTS    Reason for tests (suspected allergy): seasonal RC from spring to fall (managed with Flonase); dry. hypersensitive skin  Known previous allergies: possibly ragweed (see prior records section)    Standardized prick panels  [x] Atopic panel (20 tests)  [] Pediatric Panel (12 tests)  [] Milk, Meat, Eggs, Grains (20 tests)   [] Dust, Epithelia, Feathers (10 tests)  [] Fish, Seafood, Shellfish (17 tests)  [] Nuts, Beans (14 tests)  [] Spice, Vegetable, Fruit (17 tests)  [] Pollen Panel = Tree, Grass, Weed (24 tests)  []  Others:     [] Patient's Own Products:   DO NOT test if chemical or biological identity is unknown!   always ask from patient the product information and safety sheets (MSDS)     Standardized intradermal tests  [] Alternaria alternata  [] Aspergillus fumigatus  [] Cladosporium herbarum   [] Penicillium notatum  [] Dermatophagoides farinae  [] Dermatophagoides pteronyssinus  [] Dog Epithelium  [] Cat Epithelium  [] Others:     [] Bee venom   [] Wasp venom  !!Specific protocol with dilutions!!       Order for Drug allergy tests (prick & intradermal & patch tests)    [x] Penicillin G     [x] Ampicillin   [] Cefazolin        [] Ceftriaxone     [] Ceftazidime     [] Cefepime     [] Cefuroxime  [] Bactrim  [] Iodixanol     [] Iopamidol        [] Iohexol  [] Others:   [] Patient's Own:   Order for 9/4/24 as test date      Atopy Screen (Placed Sep 4, 2024)  No Substance Readings (15 min) Evaluation   POS Histamine 1mg/ml ++    NEG NaCl 0.9% -      No Substance Readings (15 min) Evaluation   1 Alternaria alternata (tenuis)  -    2 Cladosporium herbarum -    3 Aspergillus fumigatus -    4 Penicillium notatum -    5 Dermatophagoides pteronyssinus -    6 Dermatophagoides farinae -    7 Dog epithelium (canis spp) -    8 Cat hair (nevin catus) -    9 Cockroach   (Blatella americana & germanica) -    10 Grass mix [facility]   (Vita, Orchard, Redtop, Marino) -    11 Pravin grass (sorghum halepense) -    12 Weed mix   (common Cocklebur, Lamb s quarters, rough redroot Pigweed, Dock/Sorrel) -    13 Mug wort (artemisia vulgare) -    14 Ragweed giant/short (ambrosia spp) -    15 White birch (Betula papyrifera) -    16 Tree mix 1 (Pecan, Maple BHR, Oak RVW, american Madison, black Gary) -    17 Red cedar (juniperus virginia) -    18 Tree mix 2   (white Rob, river/red Birch, black Sebree, common Schriever, american Elm) -    19 Box elder/Maple mix (acer spp) -    20 Raccoon shagbark (carya ovata) -    Conclusion:    DRUG ALLERGY TEST  SERIES Sep 4, 2024   Prick Tests         Substance/ Allergen Conc Result (20 min) Remarks    Benzylpenicillin (Penicillin G) 1 Luis IU/ml (600 mg) -     Ampicillin 100mg/ml -       Intradermal Tests   immed immed delay delay      Substance Conc 1st dil  2nd dil  2 days  4 days remarks    Benzylpenicillin (Penicillin G) 1:100 -        Ampicillin 1:10 -           Patch Tests  as is as is 1:2 1:2     As Is  Vas Substance Conc 2 days 4 days 2 days 4 days remarks   1 2 Benzylpenicillin (Penicillin G) 1 Sully IU/ml (600 mg)        3 4 Ampicillin 100 mg/ml            Assessment & Plan:    ==> Final Diagnosis:     # Ruling out drug allergy with generalized itching a few days into treatment with penicillin    # Suspicion for atopic predisposition with:   Seasonal RC from spring to fall (managed with Flonase)  Dry. hypersensitive skin  * stable chronic illness    These conclusions are made at the best of one's knowledge and belief based on the provided evidence such as patient's history and allergy test results and they can change over time or can be incomplete because of missing information.    ==> Treatment Plan:    >> For IDT and patch test sites from today, patient will monitor sites for the next 2 and 4 days. If there are any delayed reactions, patient will take photos and report to clinic via [employer] for review.     >> Epic allergy list updated:  - Augmentin and Penicillins removed with the following comment:  - For completed prick, intradermal, and patch tests, there were no signs for specific immediate or delayed type reactions to penicillins (penicillin G and ampicillin). Can be used if necessary. For 1st dose, keep patient in clinic for observation for 30 minutes.   - Lisinopril removed with the following comment:  - During allergy consult, after detailed discussion with patient, coughing is a common side effect of lisinopril and not an allergy.     >> She is scheduled to see ENT on 9/9/24 for hearing aids and to get  ears cleaned. Recommend she discuss coughing episode from 6/10/24 with them, as environmental allergy testing was negative today.    Procedures Performed: Allergy prick and drug tests    Staff Involved: Provider, Staff, Medical Student, and Scribe    Scribe Disclosure:   I, YANNI BASILIA, am serving as a scribe to document services personally performed by Keo Messina MD based on data collection and the provider's statements to me.     Staff Physician Comments:  I was present with the scribe who participated in the documentation of the note. I have verified the history and personally performed the physical exam and medical decision making. I agree with the assessment and plan as documented in the note. I have reviewed and if necessary amended the note.      Also, I was present with the medical student who participated in the service and in the documentation of the note. I have verified the history and personally performed the physical exam and medical decision making. I agree with the assessment and plan as documented in the note. I have reviewed and if necessary amended the note.    Keo Messina MD  Professor  Head of Dermato-Allergy Division  Department of Dermatology  [facility]     Follow-up in Derm-Allergy clinic PRN    I spent a total of 40 minutes with Ericka Lyman. This time was spent counseling the patient and/or coordinating care, explaining the allergy tests, performing allergy tests and assessing the clinical relevance.

## 2024-09-04 NOTE — NURSING NOTE
Chief Complaint   Patient presents with    Allergy Consult     Possible Penicillin and/or amoxicillin allergy     Michelle Curry RN

## 2024-09-04 NOTE — PROGRESS NOTES
The patient dropped off her hearing aids at the Audiology Clinic on 9/4/24, indicating the right one wasn't working. Examination found the right  filter was occluded with wax. The hearing aids were cleaned and the filters and domes were replaced. A listening check found the hearing aids to be working properly and a MyChart message was sent to the patient letting her know they could be picked up. A clean and check charge is being assessed for today's services.    Kenrick Marin  Audiology Clinic Assistant      I delegated this task to the audiology assistant and approve of the assessment and services provided for Ericka's hearing aids.    Johnathan Mederos, CCC-A  Clinical Audiologist  [place] #[id number]

## 2024-09-04 NOTE — LETTER
9/4/2024      Ericka Lyman  [address]  [facility] [address]      Dear Colleague,    Thank you for referring your patient, Ericka Lyman, to the [facility] [place]. Please see a copy of my visit note below.    [facility] Dermato-allergology Note  Office visit  Encounter Date: Sep 4, 2024  ____________________________________________    CC: Allergy Consult (Possible Penicillin and/or amoxicillin allergy)      HPI:  (Sep 4, 2024)  Ms. Ericka Lyman is a(n) 78 year old female who presents today as new patient for allergy consultation  - Referred by Dr. Keaton Costello/Dr. Hitesh De La Garza () on 2/13/24 for possible penicillin and amoxicillin allergies  - Provider referral comment: unsure if real allergies and testing requested  - Penicillins  - Maybe 8 years ago (documented in Epic 2/1/19), prescribed penicillin for sinus infection  - No bumps or redness; no other symptoms  - Took several doses, was scratching all over  - Stopped taking it, and itching stopped  - Skin was not dry or scaly after  - But she is unsure that she is truly allergic, so she would like to be tested  - 12/11/23 entry in Epic allergy list for Augmentin and reaction was diarrhea  - Patient does not know why that was entered; only visit from that day was with internist Dr. Gutierrez (her first visit with him); she is scheduled to see him tomorrow   - Lisinopril  - Has been on 3 different medications for HTN, including lisinopril  - Developed coughing, but no lip swelling  - Currently on Cozaar and simvastatin  - Not currently on antihistamines; only takes medication for HTN and HLD, and currently using Flonase  - Otherwise feeling well in usual state of health    Physical Exam:  General: In no acute distress, well-developed, well-nourished  Eyes: no conjunctivitis  ENT: no signs of rhinitis   Pulmonary: no wheezing or coughing  Skin: Focused examination of the skin on test sites was  performed = see test results below    Past Medical History:   Patient Active Problem List   Diagnosis     Need for Tdap vaccination     Hyperlipidemia, unspecified hyperlipidemia type     Actinic keratosis     Spondylosis of lumbar spine     Lumbar radiculopathy, chronic     Primary hypertension     Lumbar radiculopathy     Past Medical History:   Diagnosis Date     Actinic keratosis      Gastroesophageal reflux disease without esophagitis      HLD (hyperlipidemia)      HTN (hypertension)      Osteoporosis      Spondylosis of lumbosacral spine with radiculopathy      Allergies:  No Active Allergies    Medications:  Current Outpatient Medications   Medication Sig Dispense Refill     Ascorbic Acid (VITAMIN C) 500 MG CAPS Take 500 mg by mouth daily 90 capsule 3     budesonide-formoterol (SYMBICORT) 80-4.5 MCG/ACT Inhaler Inhale 2 puffs into the lungs 2 times daily 10.2 g 3     calcium citrate-vitamin D (CITRACAL) 200-6.25 MG-MCG TABS per tablet Take 1 tablet by mouth 4 times daily Calcium Citrate at 1000 MG Total - 77%  Vitamin D at 2000 international unit(s) - 250%       fish oil-omega-3 fatty acids 1000 MG capsule Take 2 capsules (2 g) by mouth daily 180 capsule 3     fluticasone (FLONASE) 50 MCG/ACT nasal spray Spray 1 spray into both nostrils daily as needed for rhinitis or allergies 11.1 mL 2     losartan (COZAAR) 25 MG tablet Take 0.5 tablets (12.5 mg) by mouth daily 45 tablet 3     meloxicam (MOBIC) 7.5 MG tablet Take 1 tablet (7.5 mg) by mouth daily as needed for moderate pain 90 tablet 1     montelukast (SINGULAIR) 10 MG tablet Take 1 tablet (10 mg) by mouth at bedtime 30 tablet 0     multivitamin w/minerals (THERA-VIT-M) tablet Take 1 tablet by mouth daily Generic 90 tablet 3     omeprazole (PRILOSEC) 40 MG DR capsule Take 1 capsule (40 mg) by mouth daily 90 capsule 3     simvastatin (ZOCOR) 20 MG tablet Take 1 tablet (20 mg) by mouth at bedtime 90 tablet 3     No current facility-administered medications for  this visit.     Family History:  Family History   Problem Relation Age of Onset     Glaucoma Mother      Heart Failure Mother      Ovarian Cancer Mother      Ulcers Father      Rheumatoid Arthritis Father      Myocardial Infarction Father      Breast Cancer Maternal Grandmother      Skin Cancer No family hx of      Melanoma No family hx of      Previous Labs, Allergy Tests, Dermatopathology, Imaging:  [Upon review of Epic chart, no prior allergy records as of 9/4/24.]     2/13/24 Dr. Keaton oCstello/Dr. Hitesh De La Garza ()  FROM [facility]:  Female with a PMHx of osteopenia, GERD, HLD who comes in for evaluation of high blood pressure. Patient was at her orthopedic appointment on 2/8/24 and was noted to have a blood pressure of 190/133. She also had other elevated readings above the normal. She checked her BP at home, which ranged from 131-148/81-93. She exercises and does not consume much salt in her diet.     Also, patient has a family history of breast cancer and asks to continue obtaining mammograms. We discussed the recommendations by the USPSTF and breast cancer foundation. Patient wants to continue mammograms.     Patient has a documented history of penicillins and amoxicillin-clavulanate. She asks for allergy testing.     Medications and allergies reviewed by me today.     FROM A&P:  Essential hypertension  Patient with SBP ranging from 130-148 and DBP from 81-93 at home. Also with clinic readings up to 190/133. Discussed continuation of healthy diet and exercise. Will start patient on low dose amlodipine. She will continue to monitor blood pressure at home and keep a journal. Patient instructed to message clinic with home BP readings after 2 weeks and amlodipine may be titrated as needed.   -     amLODIPine (NORVASC) 2.5 MG tablet; Take 1 tablet (2.5 mg) by mouth daily  -Home BP monitoring  -Healthy diet with reduced salt intake     Healthcare maintenance  Encounter for screening mammogram for malignant neoplasm  of breast  -     *MA Screening Digital Bilateral; Future     Allergic reaction, initial encounter  -     Adult Allergy/Asthma  Referral; Future    Referred By: Hitesh De La Garza MD ()  [address] [facility] [address] [place] [address]     Allergy Tests:  Past Allergy Test - reports 1 allergy test decades ago; believes weed pollen positive, but she is unsure; she remembers only being told she has hay fever    Social History:  The patient .... Patient has the following hobbies or non-occupational exposure: ....    Order for Future Allergy Testing:    [x] Outpatient  [] Inpatient: [facility]..../ Bed ...    Skin Atopy (atopic dermatitis)? [x] Yes     [] No  Comments: chronically dry skin; moisturizes regularly  Childhood eczema?   [] Yes     [] No  Comments:   Hand eczema?   [] Yes     [] No  If yes, leading hand? [] Right     [] Left     [] Ambidextrous  Comments:     Contact allergies?   [] Yes     [] No  Comments:   Including adhesives/bandages? [] Yes     [] No  Comments:   Including metals?   [] Yes     [] No  Comments:     Drug allergies?   [x] Yes     [] No  Comments: reason for visit - see HPI    Angioedema?   [] Yes     [] No  Comments:     Urticaria?   [] Yes     [] No  Comments:     Food allergies?   [] Yes     [] No  Comments:     Pet allergies?   [] Yes     [x] No  Comments: none at home and no reactions to them; pets a dog at a place she frequents, but no issues    Environmental allergy symptoms?  [] Conjunctivitis  [] Otitis  [] Pharyngitis  [] Polyposis  [] Postnasal Drip  [x] Rhinitis  [x] Sinusitis  [] None  Comments: takes Flonase daily summer to fall and it manages symptoms well    HENT Operations?  [] Adenoids  [] Septum  [] Sinus  [] Tonsils  [] Other:   [] None  Comments:     Pulmonary symptoms (from birth to present)?  [] Asthma bronchiale  Inhaler(s)?:   [x] Coughing  [] Other  [] None  Comments: had episode of coughing, saw doctor filling in for Dr. Gutierrez since he is only in  clinic 2 days/week; rx inhaler, she used x 1 week, and then cough resolved, so she hasn't used inhaler since; had not stopped HTN meds at that time; denies H/O asthma prior to then    Environmental and pulmonary symptoms aggravated by?  Season: [] None     [] I     [] II     [x] III     [x] IV     [x] V     [x] VI          [x] VII     [x] VIII     [x] IX     [x] X     [] XI     [] XII     [] Perennial  Time of Day: [x] None     [] Morning     [] Noon     [] Evening     [] Night     [] Whole Day  Location/Changes: [x] None     [] Inside     [] Outside     [] Mountain     [] Sea     [] Other:   Triggers (specific): [x] None     [] Animals     [] Dust     [] Mold     [] Plants     [] Other:   Triggers (other): [x] None     [] Psyche     [] Sport     [] Work     [] Other:   Irritant: [x] None     [] Cold     [] Heat     [] Odors     [] Physical Efforts     [] Smoke     [] Other:     Order for PATCH TESTS  Reason for tests (suspected allergy): not necessary at this time  Known previous allergies: none  Standardized panels  [] Standard panel (40 tests)  [] Preservatives & Antimicrobials (31 tests)  [] Emulsifiers & Additives (25 tests)   [] Perfumes/Flavours & Plants (25 tests)  [] Hairdresser panel (12 tests)  [] Rubber Chemicals (22 tests)  [] Plastics (26 tests)  [] Colorants/Dyes/Food additives (20 tests)  [] Metals (implants/dental) (24 tests)  [] Local anaesthetics/NSAIDs (13 tests)  [] Antibiotics & Antimycotics (14 tests)   [] Corticosteroids (15 tests)   [] Photopatch test (62 tests)   [] Others:     [] Patient's Own Products:   DO NOT test if chemical or biological identity is unknown!   always ask from patient the product information and safety sheets (MSDS)       Order for PRICK TESTS    Reason for tests (suspected allergy): seasonal RC from spring to fall (managed with Flonase); dry. hypersensitive skin  Known previous allergies: possibly ragweed (see prior records section)    Standardized prick panels  [x]  Atopic panel (20 tests)  [] Pediatric Panel (12 tests)  [] Milk, Meat, Eggs, Grains (20 tests)   [] Dust, Epithelia, Feathers (10 tests)  [] Fish, Seafood, Shellfish (17 tests)  [] Nuts, Beans (14 tests)  [] Spice, Vegetable, Fruit (17 tests)  [] Pollen Panel = Tree, Grass, Weed (24 tests)  [] Others:     [] Patient's Own Products:   DO NOT test if chemical or biological identity is unknown!   always ask from patient the product information and safety sheets (MSDS)     Standardized intradermal tests  [] Alternaria alternata  [] Aspergillus fumigatus  [] Cladosporium herbarum   [] Penicillium notatum  [] Dermatophagoides farinae  [] Dermatophagoides pteronyssinus  [] Dog Epithelium  [] Cat Epithelium  [] Others:     [] Bee venom   [] Wasp venom  !!Specific protocol with dilutions!!       Order for Drug allergy tests (prick & intradermal & patch tests)    [x] Penicillin G     [x] Ampicillin   [] Cefazolin        [] Ceftriaxone     [] Ceftazidime     [] Cefepime     [] Cefuroxime  [] Bactrim  [] Iodixanol     [] Iopamidol        [] Iohexol  [] Others:   [] Patient's Own:   Order for 9/4/24 as test date      Atopy Screen (Placed Sep 4, 2024)  No Substance Readings (15 min) Evaluation   POS Histamine 1mg/ml ++    NEG NaCl 0.9% -      No Substance Readings (15 min) Evaluation   1 Alternaria alternata (tenuis)  -    2 Cladosporium herbarum -    3 Aspergillus fumigatus -    4 Penicillium notatum -    5 Dermatophagoides pteronyssinus -    6 Dermatophagoides farinae -    7 Dog epithelium (canis spp) -    8 Cat hair (nevin catus) -    9 Cockroach   (Blatella americana & germanica) -    10 Grass mix midwest   (Vita, Orchard, Redtop, Marino) -    11 Pravin grass (sorghum halepense) -    12 Weed mix   (common Cocklebur, Lamb s quarters, rough redroot Pigweed, Dock/Sorrel) -    13 Mug wort (artemisia vulgare) -    14 Ragweed giant/short (ambrosia spp) -    15 White birch (Betula papyrifera) -    16 Tree mix 1 (Pecan, Maple BHR,  [place] RVW, american [place], black Donora) -    17 Red cedar (juniperus virginia) -    18 Tree mix 2   (white Rob, river/red Birch, black [place], common [place], american Elm) -    19 Box elder/Maple mix (acer spp) -    20 Carolina shagbark (carya ovata) -    Conclusion:    DRUG ALLERGY TEST SERIES Sep 4, 2024   Prick Tests         Substance/ Allergen Conc Result (20 min) Remarks    Benzylpenicillin (Penicillin G) 1 Luis IU/ml (600 mg) -     Ampicillin 100mg/ml -       Intradermal Tests   immed immed delay delay      Substance Conc 1st dil  2nd dil  2 days  4 days remarks    Benzylpenicillin (Penicillin G) 1:100 -        Ampicillin 1:10 -           Patch Tests  as is as is 1:2 1:2     As Is  Vas Substance Conc 2 days 4 days 2 days 4 days remarks   1 2 Benzylpenicillin (Penicillin G) 1 Luis IU/ml (600 mg)        3 4 Ampicillin 100 mg/ml            Assessment & Plan:    ==> Final Diagnosis:     # Ruling out drug allergy with generalized itching a few days into treatment with penicillin    # Suspicion for atopic predisposition with:   Seasonal RC from spring to fall (managed with Flonase)  Dry. hypersensitive skin  * stable chronic illness    These conclusions are made at the best of one's knowledge and belief based on the provided evidence such as patient's history and allergy test results and they can change over time or can be incomplete because of missing information.    ==> Treatment Plan:    >> For IDT and patch test sites from today, patient will monitor sites for the next 2 and 4 days. If there are any delayed reactions, patient will take photos and report to clinic via MyChart for review.     >> Epic allergy list updated:  - Augmentin and Penicillins removed with the following comment:  - For completed prick, intradermal, and patch tests, there were no signs for specific immediate or delayed type reactions to penicillins (penicillin G and ampicillin). Can be used if necessary. For 1st dose, keep patient in  clinic for observation for 30 minutes.   - Lisinopril removed with the following comment:  - During allergy consult, after detailed discussion with patient, coughing is a common side effect of lisinopril and not an allergy.     >> She is scheduled to see ENT on 9/9/24 for hearing aids and to get ears cleaned. Recommend she discuss coughing episode from 6/10/24 with them, as environmental allergy testing was negative today.    Procedures Performed: Allergy prick and drug tests    Staff Involved: Provider, Staff, Medical Student, and Scribe    Scribe Disclosure:   I, YANNI CUI, am serving as a scribe to document services personally performed by Keo Messina MD based on data collection and the provider's statements to me.     Staff Physician Comments:  I was present with the scribe who participated in the documentation of the note. I have verified the history and personally performed the physical exam and medical decision making. I agree with the assessment and plan as documented in the note. I have reviewed and if necessary amended the note.      Also, I was present with the medical student who participated in the service and in the documentation of the note. I have verified the history and personally performed the physical exam and medical decision making. I agree with the assessment and plan as documented in the note. I have reviewed and if necessary amended the note.    Keo Messina MD  Professor  Head of Dermato-Allergy Division  Department of Dermatology  [facility]     Follow-up in Derm-Allergy clinic PRN    I spent a total of 40 minutes with Ericka Lyman. This time was spent counseling the patient and/or coordinating care, explaining the allergy tests, performing allergy tests and assessing the clinical relevance.      Again, thank you for allowing me to participate in the care of your patient.        Sincerely,        Keo Messina MD

## 2024-09-04 NOTE — PATIENT INSTRUCTIONS
Removal of Patch Tests on Day 3:    Remove patches and tape from test area on Friday 9/6/24 at anytime          Using the purple surgical markers provided (or other permanent marker), draw a grid around the test area so that the circular indentation is in the center of each square, as below. Try to be as neat as possible and keep the lines as straight as you can (you can use a ruler if you need to)          Redraw the number that was underneath the tape above the grids, as shown below. Try to print clearly.            Photograph the entire area then take close-up photos of any possible reactions. Examples of possible reactions are spots that look like these: TAKE PHOTOS ON Friday AND Sunday, send them via [employer] Message to Dr. Messina's team          Return to clinic for evaluation as instructed/Send photos via [employer] message and team will reach out to you if we need to schedule a follow up. After removing on Friday, you should continue to avoid scrubbing the area, exposing the area to UV light, using topical steroids, or scratching.     REMINDER - THE PURPLE MARKER WILL STAIN CLOTHING, BEDDING, FURNITURE, ETC. WEAR A DARK COLORED SHIRT OR SOMETHING THAT YOU DON'T CARE IF IT GETS STAINED UP, UNTIL YOU CAN WASH IT FULLY AFTER THE SECOND SET OF PHOTOS ARE DONE.        Who should I call with questions?  [facility], [place]: [phone]  For urgent needs outside of business hours call the [facility] at [phone] and ask for the dermatology resident on call      If you develop any serious or adverse allergic reaction after office hours please seek immediate medical assistance at the nearest clinic or emergency room

## 2024-09-06 ENCOUNTER — MYC MEDICAL ADVICE (OUTPATIENT)
Dept: ALLERGY | Facility: CLINIC | Age: 79
End: 2024-09-06
Payer: MEDICARE

## 2024-09-09 ENCOUNTER — OFFICE VISIT (OUTPATIENT)
Dept: OTOLARYNGOLOGY | Facility: CLINIC | Age: 79
End: 2024-09-09
Payer: MEDICARE

## 2024-09-09 VITALS
SYSTOLIC BLOOD PRESSURE: 145 MMHG | DIASTOLIC BLOOD PRESSURE: 80 MMHG | HEIGHT: 59 IN | OXYGEN SATURATION: 99 % | WEIGHT: 147.7 LBS | BODY MASS INDEX: 29.78 KG/M2 | HEART RATE: 84 BPM

## 2024-09-09 DIAGNOSIS — H61.23 BILATERAL IMPACTED CERUMEN: Primary | ICD-10-CM

## 2024-09-09 PROCEDURE — 69210 REMOVE IMPACTED EAR WAX UNI: CPT | Performed by: PHYSICIAN ASSISTANT

## 2024-09-09 ASSESSMENT — PAIN SCALES - GENERAL: PAINLEVEL: NO PAIN (0)

## 2024-09-09 NOTE — LETTER
9/9/2024       RE: Ericka Lyman  [address]  [facility] [address]     Dear Colleague,    Thank you for referring your patient, Ericka Lyman, to the [facility] EAR NOSE AND THROAT CLINIC [place] at [facility]. Please see a copy of my visit note below.      Otolaryngology Clinic  September 9, 2024    HPI:  Ericka Lyman is here for cerumen impaction removal. Patient wears bilateral hearing aids. She feels her hearing is decreased due to wax. She puts oil in her ears once a week for wax.     Otologic microscope exam:    Biocular Microscopy exam is needed due to deep impaction of cerumen of bilateral ears, requiring direct visualization for use of cleaning instruments.    Right ear was examined under the microscope.  Cerumen impaction noted. It was cleaned with suction. Once cleaned, TM visualized under microscope. Normal appearing TM, nicely aerated middle ear space.     Left ear was also examined under the microscope.  Cerumen impaction noted. It was cleaned with alligator forceps. Once cleaned, TM visualized under microscope. Normal appearing TM, nicely aerated middle ear space.     The patient noted improvement of symptoms.    Assessment and Plan:  Cerumen, bilateral    Patient presents with cerumen impaction of bilateral ears.  The patient's ears are cleaned today.  Healthy exam after cleaning. Return as needed for cleaning.       Saira Mojica PA-C  Otolaryngology  Head & Neck Surgery  [phone]      Again, thank you for allowing me to participate in the care of your patient.      Sincerely,    Saira Mojica PA-C

## 2024-09-09 NOTE — PATIENT INSTRUCTIONS
You were seen in the ENT Clinic today by Saira Mojica. If you have any questions or concerns after your appointment, please contact us (see below)    The following has been recommended for you based upon your appointment today:  Continue with oil in the ear weekly    Please return to clinic in 6 months for ear cleaning    How to Contact Us:  Send a [employer] message to your provider. Our team will respond to you via [employer]. Occasionally, we will need to call you to get further information.  For urgent matters (Monday-Friday), call the ENT Clinic: [phone] and speak with a call center team member - they will route your call appropriately.   If you'd like to speak directly with a nurse, please find our contact information below. We do our best to check voicemail frequently throughout the day, and will work to call you back within 1-2 days. For urgent matters, please use the general clinic phone numbers listed above.      Debbie PAREKH LPN  Direct: [phone]

## 2024-09-09 NOTE — NURSING NOTE
"Chief Complaint   Patient presents with    RECHECK   Blood pressure (!) 145/80, pulse 84, height 1.486 m (4' 10.5\"), weight 67 kg (147 lb 11.2 oz), SpO2 99%. Oskar Boone, EMT    "

## 2024-09-09 NOTE — PROGRESS NOTES
Otolaryngology Clinic  September 9, 2024    HPI:  Ericka Lyman is here for cerumen impaction removal. Patient wears bilateral hearing aids. She feels her hearing is decreased due to wax. She puts oil in her ears once a week for wax.     Otologic microscope exam:    Biocular Microscopy exam is needed due to deep impaction of cerumen of bilateral ears, requiring direct visualization for use of cleaning instruments.    Right ear was examined under the microscope.  Cerumen impaction noted. It was cleaned with suction. Once cleaned, TM visualized under microscope. Normal appearing TM, nicely aerated middle ear space.     Left ear was also examined under the microscope.  Cerumen impaction noted. It was cleaned with alligator forceps. Once cleaned, TM visualized under microscope. Normal appearing TM, nicely aerated middle ear space.     The patient noted improvement of symptoms.    Assessment and Plan:  Cerumen, bilateral    Patient presents with cerumen impaction of bilateral ears.  The patient's ears are cleaned today.  Healthy exam after cleaning. Return as needed for cleaning.       Saira Mojica PA-C  Otolaryngology  Head & Neck Surgery  [phone]

## 2024-09-19 DIAGNOSIS — K21.9 GASTROESOPHAGEAL REFLUX DISEASE WITHOUT ESOPHAGITIS: ICD-10-CM

## 2024-09-24 RX ORDER — OMEPRAZOLE 40 MG/1
40 CAPSULE, DELAYED RELEASE ORAL DAILY
Qty: 90 CAPSULE | Refills: 2 | Status: SHIPPED | OUTPATIENT
Start: 2024-09-24

## 2024-09-24 NOTE — TELEPHONE ENCOUNTER
omeprazole (PRILOSEC) 40 MG DR capsule       Last Written Prescription Date:  12/11/23  Last Fill Quantity: 90,   # refills: 3  Last Office Visit : 6/10/24  Future Office visit:  11/7/24    Refilled per protocol  Nadine VILLARREAL RN  P Central Nursing/Red Flag Triage & Med Refill Team

## 2024-11-02 ENCOUNTER — MYC MEDICAL ADVICE (OUTPATIENT)
Dept: INTERNAL MEDICINE | Facility: CLINIC | Age: 79
End: 2024-11-02
Payer: MEDICARE

## 2024-11-07 ENCOUNTER — OFFICE VISIT (OUTPATIENT)
Dept: INTERNAL MEDICINE | Facility: CLINIC | Age: 79
End: 2024-11-07
Payer: MEDICARE

## 2024-11-07 VITALS
DIASTOLIC BLOOD PRESSURE: 80 MMHG | RESPIRATION RATE: 16 BRPM | HEIGHT: 58 IN | TEMPERATURE: 98 F | BODY MASS INDEX: 30.46 KG/M2 | SYSTOLIC BLOOD PRESSURE: 129 MMHG | OXYGEN SATURATION: 99 % | WEIGHT: 145.1 LBS | HEART RATE: 85 BPM

## 2024-11-07 DIAGNOSIS — M25.532 PAIN IN BOTH WRISTS: ICD-10-CM

## 2024-11-07 DIAGNOSIS — M81.0 AGE-RELATED OSTEOPOROSIS WITHOUT CURRENT PATHOLOGICAL FRACTURE: ICD-10-CM

## 2024-11-07 DIAGNOSIS — E78.5 HYPERLIPIDEMIA, UNSPECIFIED HYPERLIPIDEMIA TYPE: Primary | ICD-10-CM

## 2024-11-07 DIAGNOSIS — I10 PRIMARY HYPERTENSION: ICD-10-CM

## 2024-11-07 DIAGNOSIS — M47.816 SPONDYLOSIS OF LUMBAR SPINE: ICD-10-CM

## 2024-11-07 DIAGNOSIS — M25.531 PAIN IN BOTH WRISTS: ICD-10-CM

## 2024-11-07 PROCEDURE — 99214 OFFICE O/P EST MOD 30 MIN: CPT | Performed by: HOSPITALIST

## 2024-11-07 RX ORDER — CHLORAL HYDRATE 500 MG
2 CAPSULE ORAL DAILY
Qty: 180 CAPSULE | Refills: 3 | Status: SHIPPED | OUTPATIENT
Start: 2024-11-07

## 2024-11-07 RX ORDER — LOSARTAN POTASSIUM 25 MG/1
12.5 TABLET ORAL DAILY
Qty: 45 TABLET | Refills: 3 | Status: SHIPPED | OUTPATIENT
Start: 2024-11-07

## 2024-11-07 RX ORDER — SIMVASTATIN 20 MG
20 TABLET ORAL AT BEDTIME
Qty: 90 TABLET | Refills: 3 | Status: SHIPPED | OUTPATIENT
Start: 2024-11-07

## 2024-11-07 RX ORDER — MELOXICAM 7.5 MG/1
7.5 TABLET ORAL DAILY PRN
Qty: 90 TABLET | Refills: 1 | Status: SHIPPED | OUTPATIENT
Start: 2024-11-07

## 2024-11-07 NOTE — PROGRESS NOTES
"  Assessment & Plan   Problem List Items Addressed This Visit       Primary hypertension     - Continued on losartan 12.5mg daily.          Pain in both wrists     No edema of wrists. 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   - Would recommend soft wrist support for keyboard for computer.   - May continue using wrist brace at night.   - May use a warm pad for wrist pains at the end of the day (up to 15 minutes).   - May continue meloxicam prn, ibuprofen prn.          Hyperlipidemia, unspecified hyperlipidemia type - Primary     - Continued on simvastatin and fish oil.   - Repeat CMP and Lipid panel while fasting before next visit in about 8 months.          Relevant Orders    Lipid panel reflex to direct LDL Fasting    Comprehensive metabolic panel (BMP + Alb, Alk Phos, ALT, AST, Total. Bili, TP)    Age-related osteoporosis without current pathological fracture     - Patient currently on a bisphosphonate holiday for a 2 year period, started this summer. Will plan to repeat Dexa scan after 2 years.   - Continued on calcium/vitamin D.                   BMI  Estimated body mass index is 29.85 kg/m  as calculated from the following:    Height as of this encounter: 1.485 m (4' 10.47\").    Weight as of this encounter: 65.8 kg (145 lb 1.6 oz).           Return in about 8 months (around 7/7/2025).      Monika Barnett is a 79 year old, presenting for the following health issues:  Follow Up (Pt here to follow up; discuss medication, BP, bone density results, pt reports bilateral wrist pain)      11/7/2024     1:13 PM   Additional Questions   Roomed by Nataliya VIDES     History of Present Illness       Hypertension: She presents for follow up of hypertension.  She does check blood pressure  regularly outside of the clinic. Outpatient blood pressures have not been over 140/90. She follows a low salt diet.     She eats 4 or more servings of fruits and vegetables daily.She consumes 0 sweetened beverage(s) daily.She exercises " "with enough effort to increase her heart rate 20 to 29 minutes per day.  She exercises with enough effort to increase her heart rate 4 days per week.   She is taking medications regularly.     Continues with collagen peptide the past 8 months.     Still on alendronate holiday, stopped this summer. Discussed will hold alendronate for 2 years and repeat Dexa scan.     Wrists are painful the past few months. Had fracture of wrist in the past on right with hardware and fracture when young of left wrist. Has been wrist immobilizers at night.       Review of Systems  Constitutional, neuro, ENT, endocrine, pulmonary, cardiac, gastrointestinal, genitourinary, musculoskeletal, integument and psychiatric systems are negative, except as otherwise noted.      Objective    /80 (BP Location: Right arm, Patient Position: Sitting, Cuff Size: Adult Regular)   Pulse 85   Temp 98  F (36.7  C) (Oral)   Resp 16   Ht 1.485 m (4' 10.47\")   Wt 65.8 kg (145 lb 1.6 oz)   SpO2 99%   BMI 29.85 kg/m    Body mass index is 29.85 kg/m .  Physical Exam   GENERAL: alert and no distress  EYES: Eyes grossly normal to inspection, PERRL and conjunctivae and sclerae normal  RESP: lungs clear to auscultation - no rales, rhonchi or wheezes  CV: regular rate and rhythm, normal S1 S2, no S3 or S4, no murmur, click or rub, no peripheral edema  MS: no gross musculoskeletal defects noted, no edema. No joint effusion of both wrists. Has limited ROM with flexion of wrists to about 45 degrees and about 30 degrees with wrist extensions bilaterally.   SKIN: no suspicious lesions or rashes  NEURO: Normal strength and tone, mentation intact and speech normal  PSYCH: mentation appears normal, affect normal/bright            Signed Electronically by: Fredi Gutierrez MD    "

## 2024-11-07 NOTE — ASSESSMENT & PLAN NOTE
- Continued on simvastatin and fish oil.   - Repeat CMP and Lipid panel while fasting before next visit in about 8 months.

## 2024-11-07 NOTE — ASSESSMENT & PLAN NOTE
- Patient currently on a bisphosphonate holiday for a 2 year period, started this summer. Will plan to repeat Dexa scan after 2 years.   - Continued on calcium/vitamin D.

## 2024-11-07 NOTE — PATIENT INSTRUCTIONS
- Would recommend soft wrist support for keyboard for computer.   - May continue using wrist brace at night.   - May use a warm pad for wrist pains at the end of the day (up to 15 minutes).   - Continue current medications.     Follow up again in 8 months or as needed.

## 2024-11-07 NOTE — ASSESSMENT & PLAN NOTE
No edema of wrists. 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   - Would recommend soft wrist support for keyboard for computer.   - May continue using wrist brace at night.   - May use a warm pad for wrist pains at the end of the day (up to 15 minutes).   - May continue meloxicam prn, ibuprofen prn.

## 2025-01-08 ENCOUNTER — PATIENT OUTREACH (OUTPATIENT)
Dept: CARE COORDINATION | Facility: CLINIC | Age: 80
End: 2025-01-08
Payer: MEDICARE

## 2025-01-19 ENCOUNTER — HEALTH MAINTENANCE LETTER (OUTPATIENT)
Age: 80
End: 2025-01-19

## 2025-01-29 ASSESSMENT — PAIN SCALES - PAIN ENJOYMENT GENERAL ACTIVITY SCALE (PEG)
AVG_PAIN_PASTWEEK: 4
INTERFERED_ENJOYMENT_LIFE: 4
INTERFERED_GENERAL_ACTIVITY: 4
PEG_TOTALSCORE: 4

## 2025-01-30 ENCOUNTER — OFFICE VISIT (OUTPATIENT)
Dept: ANESTHESIOLOGY | Facility: CLINIC | Age: 80
End: 2025-01-30
Payer: MEDICARE

## 2025-01-30 VITALS
HEART RATE: 77 BPM | RESPIRATION RATE: 16 BRPM | OXYGEN SATURATION: 97 % | DIASTOLIC BLOOD PRESSURE: 77 MMHG | SYSTOLIC BLOOD PRESSURE: 131 MMHG

## 2025-01-30 DIAGNOSIS — M47.816 SPONDYLOSIS OF LUMBAR SPINE: ICD-10-CM

## 2025-01-30 DIAGNOSIS — M54.16 LUMBAR RADICULOPATHY, CHRONIC: Primary | ICD-10-CM

## 2025-01-30 RX ORDER — DIAZEPAM 5 MG/1
5-10 TABLET ORAL
OUTPATIENT
Start: 2025-01-30

## 2025-01-30 ASSESSMENT — PAIN SCALES - GENERAL: PAINLEVEL_OUTOF10: MILD PAIN (2)

## 2025-01-30 NOTE — PROGRESS NOTES
[facility] : Progress Note    Date of visit: 1/30/2025    Chief Complaint   Patient presents with    RECHECK    Back Pain     Follow up-needs another eval         The patient has a medical history significant for spondylosis of the lumbar spine, lower back pain, lumbar radiculopathy, osteopenia, and hyperlipidemia.  She developed back pain and had 4 steroid injections ( L4-L5 transforaminal epidural steroid injection) in her back in February and August.  Injection did help her pain.   Pain never goes below knee.  Pain bilateral (left greater than right). Since August 2023 pain are slowly getting worse.  She developed some weakness in the left leg but no bowel or bladder incontinence.  She walks with a cane.  Her MRI of the lumbosacral spine reviewed.  Patient has pronounced degenerative changes in the lumbar and lower thoracic spine including loss of disc dehydration, disc bulging osteophyte formation at the disc and hypertrophic arthritic changes at the facet joints.  She also has ligamentum flavum thickening.  There is spinal stenosis pronounced at the L4-L5.  And grade 1 spondylolisthesis at L5-S1.  70% of his her pain is back and 30% the leg.      Interval history:    Patient notes that she has been doing well.  She has been having lumbar epidural steroid injection every 3 months.  Last 2 injections were performed by me with good pain relief.  As her pain is slowly coming back she wants to repeat the injection.  She does not have any other additional questions.    [place] Prescription Monitoring Program:   Reviewed. No concerns     Review of Systems:  The 14 system ROS was reviewed and was negative except what is documented above and as follows.  Any bowel or bladder problems: none  Mood: okay    Physical Exam:  Vitals:    01/30/25 0944   BP: 131/77   Pulse: 77   Resp: 16   SpO2: 97%       General: Awake in no apparent distress.   Eyes:  Sclerae are anicteric. PERRLA, EOMI   Neck: supple, no masses.   Lungs: unlabored.   Heart: regular rate and rhythm   Abdomen: soft non tender.  Extremities: Pulses are well palpable, no peripheral edema.   Musculoskeletal: 5/5 muscle strength in all extremities.   Neurologic exam:Sensation intact throughout all dermatomes bilateral upper extremities and lower extremities  Psychiatric; Normal affect.   Skin: Warm and Dry.    Medications:  Current Outpatient Medications   Medication Sig Dispense Refill    Ascorbic Acid (VITAMIN C) 500 MG CAPS Take 500 mg by mouth daily 90 capsule 3    calcium citrate-vitamin D (CITRACAL) 200-6.25 MG-MCG TABS per tablet Take 1 tablet by mouth 4 times daily. Calcium Citrate at 1000 MG Total - 77% Vitamin D at 2000 international unit(s) - 250% 360 tablet 3    fish oil-omega-3 fatty acids 1000 MG capsule Take 2 capsules (2 g) by mouth daily. 180 capsule 3    fluticasone (FLONASE) 50 MCG/ACT nasal spray Spray 1 spray into both nostrils daily as needed for rhinitis or allergies 11.1 mL 2    losartan (COZAAR) 25 MG tablet Take 0.5 tablets (12.5 mg) by mouth daily. 45 tablet 3    meloxicam (MOBIC) 7.5 MG tablet Take 1 tablet (7.5 mg) by mouth daily as needed for moderate pain. 90 tablet 1    multivitamin w/minerals (THERA-VIT-M) tablet Take 1 tablet by mouth daily Generic 90 tablet 3    omeprazole (PRILOSEC) 40 MG DR capsule TAKE 1 CAPSULE BY MOUTH EVERY DAY 90 capsule 2    simvastatin (ZOCOR) 20 MG tablet Take 1 tablet (20 mg) by mouth at bedtime. 90 tablet 3       Analgesic Medications:   Medications related to Pain Management (From now, onward)      None               LABORATORY VALUES:   Recent Labs   Lab Test 06/03/24  0907 12/11/23  0904    135   POTASSIUM 4.4 4.2   CHLORIDE 102 100   CO2 25 26   ANIONGAP 9 9   GLC 99 94   BUN 11.9 13.9   CR 0.73 0.73   THAIS 9.7 10.0       CBC RESULTS:   Recent Labs   Lab Test 05/07/24  1526   WBC 5.4   RBC 4.59   HGB 13.8   HCT 38.8   MCV 85    MCH 30.1   MCHC 35.6   RDW 12.3          Most Recent 3 INR's:No lab results found.        ASSESSMENT:       Diagnoses         Codes Comments    Lumbar radiculopathy, chronic    -  Primary M54.16     Spondylosis of lumbar spine     M47.816             PLAN:    1. Medications.      Meloxicam 7.5 mg daily as needed  500 mg 4 times daily as needed     - Interventional procedures:  Ordered  lumbar epidural steroid injection.  Risk of the procedure including bleeding, infection, failure procedure discussed with the patient.     - Labs and imaging: None needed    - Rehab: The patient is also encouraged to stay active as tolerated. PT referral for core-strengthening exercises to relieve back pain include the pelvic tilt, cat-cow pose, bird dog, high and low planks, crunches, and exercises performed using a Swiss ball (or exercise ball).        - Psychology: No current needs.     - Integrated medicine: None needed     - Disposition:   We will see the patient for above mentioned procedure.     Assessment will be ongoing with changes in treatment as indicated.  Benefits/risks/alternatives to treatment have been reviewed and the patient has been instructed to contact this office if they have any questions or concerns.  This plan of care has been discussed with the patient and the patient is in agreement.     Omid Escobar MD, PHD

## 2025-01-30 NOTE — NURSING NOTE
RN read through the instructions with the patient for the recommended procedure: LESI  Patient verbalized understanding to holding appropriate medication per protocol and was agreeable to NPO policy and needing a .    Anticoagulant: Meloxicam- 4 day hold     Recommended Follow Up:  BAKARI Ho RN

## 2025-01-30 NOTE — NURSING NOTE
Patient presents with:  RECHECK  Back Pain: Follow up-needs another eval      Mild Pain (2)         What medications are you using for pain? Meloxicam, tylenol    What refills are you needing today? none    Expectations: follow up    Mana Reilly LPN

## 2025-01-30 NOTE — PROGRESS NOTES
[facility] Consultation    Date of visit: 1/30/2025    Reason for consultation:    Ericka Lyman is a 79 year old female who is seen in consultation today at the request of her provider, ***.    Primary Care Provider is Fredi Gutierrez.  Pain medications are being prescribed by ***.    Please see the [facility] health questionnaire which the patient completed and reviewed with me in detail.    Chief Complaint:    Chief Complaint   Patient presents with    RECHECK    Back Pain     Follow up-needs another eval       Pain history:  Ericka Lyman is a 79 year old female who first started having problems with pain in ***    Lower back  Left uppr leg > right    PT every 2 days; [facility]    Started late december; lasted 4 months    Takes tyelenol if pain bad  1 meloxicam a day    Pain rating: intensity ranges from 4/10 to 7/10  Aggravating factors include: standing for long periods of time  Relieving factors include: Sitting down  Any bowel or bladder incontinence: No    Current treatments include:  ***    Previous medication treatments included:  ***    Other treatments have included:  Ericka Lyman has been seen at a pain clinic in the past.    PT: Yes  Acupuncture: ***  TENs Unit: ***  Injections: ***    Past Medical History:  Past Medical History:   Diagnosis Date    Actinic keratosis     Gastroesophageal reflux disease without esophagitis     HLD (hyperlipidemia)     HTN (hypertension)     Osteoporosis     Seasonal allergies     Spring to Fall    Spondylosis of lumbosacral spine with radiculopathy      Patient Active Problem List    Diagnosis Date Noted    Pain in both wrists 11/07/2024     Priority: Medium    Age-related osteoporosis without current pathological fracture 11/07/2024     Priority: Medium    Lumbar radiculopathy 07/23/2024     Priority: Medium    Primary hypertension 03/11/2024     Priority: Medium    Lumbar radiculopathy,  chronic 2024     Priority: Medium    Need for Tdap vaccination 2023     Priority: Medium    Hyperlipidemia, unspecified hyperlipidemia type 2023     Priority: Medium    Actinic keratosis 2023     Priority: Medium    Spondylosis of lumbar spine 2023     Priority: Medium       Past Surgical History:  Past Surgical History:   Procedure Laterality Date    APPENDECTOMY       SECTION      x3    INJECT EPIDURAL LUMBAR Right 2024    Procedure: INJECTION, SPINE, LUMBAR, EPIDURAL;  Surgeon: Omid Escobar MD;  Location: [facility] OR    INJECT EPIDURAL LUMBAR N/A 2024    Procedure: INJECTION, SPINE, LUMBAR, EPIDURAL (L5-S1);  Surgeon: Omid Escobar MD;  Location: [facility] OR    IR LUMBAR EPIDURAL STEROID INJECTION Bilateral 2023    L4-5    IR LUMBAR EPIDURAL STEROID INJECTION Bilateral 2023    L4-5    TONSILLECTOMY      XR WRIST SURGERY JEFF RIGHT      with pins     Medications:  Current Outpatient Medications   Medication Sig Dispense Refill    Ascorbic Acid (VITAMIN C) 500 MG CAPS Take 500 mg by mouth daily 90 capsule 3    calcium citrate-vitamin D (CITRACAL) 200-6.25 MG-MCG TABS per tablet Take 1 tablet by mouth 4 times daily. Calcium Citrate at 1000 MG Total - 77% Vitamin D at 2000 international unit(s) - 250% 360 tablet 3    fish oil-omega-3 fatty acids 1000 MG capsule Take 2 capsules (2 g) by mouth daily. 180 capsule 3    fluticasone (FLONASE) 50 MCG/ACT nasal spray Spray 1 spray into both nostrils daily as needed for rhinitis or allergies 11.1 mL 2    losartan (COZAAR) 25 MG tablet Take 0.5 tablets (12.5 mg) by mouth daily. 45 tablet 3    meloxicam (MOBIC) 7.5 MG tablet Take 1 tablet (7.5 mg) by mouth daily as needed for moderate pain. 90 tablet 1    multivitamin w/minerals (THERA-VIT-M) tablet Take 1 tablet by mouth daily Generic 90 tablet 3    omeprazole (PRILOSEC) 40 MG DR capsule TAKE 1 CAPSULE BY MOUTH EVERY DAY 90 capsule 2    simvastatin (ZOCOR) 20 MG tablet  "Take 1 tablet (20 mg) by mouth at bedtime. 90 tablet 3     Allergies:   No Known Allergies  Social History:  Home situation: ***  Occupation/Schooling: ***  Tobacco use: ***  Alcohol use: ***  Drug use: ***  History of chemical dependency treatment: ***    Family history:  Family History   Problem Relation Age of Onset    Glaucoma Mother     Heart Failure Mother     Ovarian Cancer Mother     Ulcers Father     Rheumatoid Arthritis Father     Myocardial Infarction Father     Breast Cancer Maternal Grandmother     Skin Cancer No family hx of     Melanoma No family hx of      Family history of headaches: ***    Review of Systems:    POSTIVE IN BOLD  GENERAL: fever/chills, fatigue, general unwell feeling, weight gain/loss.  HEAD/EYES:  headache, dizziness, or vision changes.    EARS/NOSE/THROAT:  Nosebleeds, hearing loss, sinus infection, earache, tinnitus.  IMMUNE:  Allergies, cancer, immune deficiency, or infections.  SKIN:  Urticaria, rash, hives  HEME/Lymphatic:   anemia, easy bruising, easy bleeding.  RESPIRATORY:  cough, wheezing, or shortness of breath  CARDIOVASCULAR/Circulation:  Extremity edema, syncope, hypertension, tachycardia, or angina.  GASTROINTESTINAL:  abdominal pain, nausea/emesis, diarrhea, constipation,  hematochezia, or melena.  ENDOCRINE:  Diabetes, steroid use,  thyroid disease or osteoporosis.  MUSCULOSKELETAL: neck pain, back pain, arthralgia, arthritis, or gout.  GENITOURINARY:  frequency, urgency, dysuria, difficulty voiding, hematuria or incontinence.  NEUROLOGIC:  weakness, numbness, paresthesias, seizure, tremor, stroke or memory loss.  PSYCHIATRIC:  depression, anxiety, stress, suicidal thoughts or mood swings.     Physical Exam:  Vitals:    01/30/25 0944   BP: 131/77   Pulse: 77   Resp: 16   SpO2: 97%     Exam:  Constitutional: {GENERAL APPEARANCE:486437::\"healthy\",\"alert\",\"no distress\"}  Head: normocephalic. Atraumatic. ***  Eyes: no redness or jaundice noted ***  ENT: oropharnx normal. " " MMM.  Neck supple.  ***  Cardiovascular: RRR no m/g/r ***  Respiratory: clear ***  Gastrointestinal: soft, non-tender, normoactive bowel sounds ***  : deferred***  Skin: {SOPHIA SKIN EXAM:934037::\"no suspicious lesions or rashes\"}  Psychiatric: {SOPHIA PATIENT PSYCH APPEARANCE:634417::\"mentation appears normal\",\"affect normal/bright\"}    Musculoskeletal exam:  Gait/Station/Posture: ***  Cervical spine: ***   Flex:  *** degrees   Ext: *** degrees   Rotation to right: *** degrees   Rotation to left: *** degrees   ***    Thoracic spine:  Normal ***    Lumbar spine: ***   Flex:  *** degrees   Ext: *** degrees   Rotation/ext to right: {PAINFUL/PAIN FREE:888762}   Rotation/ext to left: {PAINFUL/PAIN FREE:098386}    Myofascial tenderness:  ***  Straight leg exam: ***  Beau's maneuver: ***    Neurologic exam:  CN:  Cranial nerves 2-12 are normal***  Motor:  5/5 UE and LE strength, except for ***  Reflexes:     Biceps:     R:  ***/4 L: ***/4   Brachioradialis   R:  ***/4 L: ***/4   Triceps:  R:  ***/4 L: ***/4   Patella:  R:  ***/4 L: ***/4   Achilles:  R:  ***/4 L: ***/4  Other reflexes:  Toes downgoing***   Sensory:  (upper and lower extremities):   Light touch: normal ***   Vibration: normal ***   Allodynia: absent ***   Dysethesia: absent ***   Hyperalgesia: absent ***    Diagnostic tests:  MRI of *** was completed on *** showing:  \"***\"    Personally reviewed imaging ***    Other testing (labs, diagnostics) reviewed:  Labs***  EKG***    [place] Prescription Monitoring Program reviewed***    Outside records reviewed***      Screening tools:  DIRE Score for ongoing opioid management is calculated as follows:    Diagnosis = ***    Intractability = ***    Risk: Psych = ***  Chem Hlth = ***  Reliability = ***  Social = ***    Efficacy = ***    Total DIRE Score = *** (14 or higher predicts good candidate for ongoing opioid management; 13 or lower predicts poor candidate for opioid management)         Assessment:  ***    Ericka AHN" Nrua is a 79 year old female who presents with the complaints of ***. ***    Plan:  Diagnosis reviewed, treatment option addressed, and risk/benefits discussed.  Self-care instructions given.  I am recommending a multidisciplinary treatment plan to help this patient better manage her pain.      Physical Therapy: ***  Pain Psychologist to address issues of relaxation, behavioral change, coping style, and other factors important to improvement: ***  Diagnostic Studies: ***  Medication Management: ***  Further procedures recommended: ***  Other treatments:  Urine toxicology screen: ***   Recommendations/follow-up for PCP:  ***  Release of information: ***  Follow up: ***    Total time spent was *** minutes, and more than 50% of face to face time was spent in counseling and/or coordination of care regarding principles of multidisciplinary care, medication management, and ***

## 2025-01-30 NOTE — PATIENT INSTRUCTIONS
Procedures:    Call to schedule your procedure: [phone] option #2    Lumbar Epidural Steroid Injection    Your pre-procedure instructions are below, please call our clinic if you have any questions.        Recommended Follow up:      Follow up as needed.        To speak with a nurse, schedule/reschedule/cancel a clinic appointment, or request a medication refill call: [phone]    You can also reach us by [employer]: [email or web address]       Procedure Information:     Please call [phone] option #2 to schedule, reschedule, or cancel your procedure appointment.   Phones are answered Monday - Friday from 08:00 - 4:30pm.  Leave a voicemail with your name, birth date, and phone number if no one is available to take your call.     The procedure center staff will call or send a MyChart several days before the procedure to review important information that you will need to know for the day of the procedure.     Please contact the clinic if you have further questions about this information [phone].        Information related to Scheduling and Pre-Procedure Instructions:  If you must reschedule your procedure more than two times, you must follow up in clinic before rescheduling again.    Preparing for your procedure    CAUTION - FAILURE TO FOLLOW THESE PRE-PROCEDURE INSTRUCTIONS WILL RESULT IN YOUR PROCEDURE BEING RESCHEDULED.    Your Procedure: LESI            You must have a  take you home after your procedure. Transportation by taxi or para-transit is okay as long as you have a responsible adult accompany you. You must provide your 's full name and contact number at time of check in.   Fasting Protocol Please have nothing to eat or drink 2 hour prior to arrival.   Medications If you take any medications, DO NOT STOP. Take your medications as usual the day of your procedure with a sip of water AT LEAST 2 HOURS PRIOR TO ARRIVAL.    Vaccines You must complete all vaccines  more than 2 weeks prior to your scheduled procedure. No vaccines until 2 weeks after your procedure.   Antibiotics If you are currently taking antibiotics, you must complete the entire dose 7 days prior to your scheduled procedure. You must be clear of any signs or symptoms of infection. If you begin antibiotics, please contact our clinic for instructions.   Fever, Chills, Rash, or COVID If you experience a fever of higher than 100 degrees, chills, rash, open wounds, develop COVID symptoms or test positive during the one week before your procedure, please call the clinic to see if you may proceed with your procedure.         Medication Hold List   **Patients under Cardiology/Neurology care should consult their provider prior to the pain procedure to verify pre-procedure medication instructions. The information below contains general guidelines.**    Blood Thinners  If you are taking daily ASPIRIN, PLAVIX, OR OTHER BLOOD THINNERS SUCH AS COUMADIN/WARFARIN, we will need your prescribing doctor to sign a release permitting you to stop these medications. Once approved by your prescribing doctor - STOP ALL BLOOD THINNERS BASED ON THE TIME TABLE BELOW PRIOR TO YOUR PROCEDURE. If you have been instructed to stop WARFARIN(COUMADIN), you must have an INR lab drawn the day before your procedure. Your INR must be within normal limits before we can perform your injection. MEDICATIONS CAN BE RESTARTED AFTER YOUR PROCEDURE.    24 HOUR HOLD  Lovenox (enoxaparin)  Agrylin (Anagrelide)    3 DAY HOLD  Xarelto (rivaroxaban)    5 DAY HOLD  Coumadin (Warfarin)  Brilinta (ticagrelor)    6 DAY HOLD  Aspirin 7 DAY HOLD  Anacin, Bufferin, Ecotrin,   Pradexa (Dabigatran)  Elmiron (Pentosan)  Plavix (Clopidogrel Bisulfate)  Pletal (Cilostazol)    10 DAY HOLD  Effient (Prasugel)    14 DAY HOLD  Ticlid (ticlopidine)        Non-steroidal Anti-inflammatories (NSAIDs) DO NOT TAKE any non-steroidal anti-inflammatory medications (NSAIDs) listed on  the table below. MEDICATIONS CAN BE RESTARTED AFTER YOUR PROCEDURE. Celebrex is OK to take and does not need to be discontinued.     Medications to stop:  1 DAY HOLD  Advil, Motrin (Ibuprofen)  Voltaren (Diclofenac)  Toradol (Ketorolac)    3 DAY HOLD  Arthrotec (diclofenac sodium/misoprostol)  Clinoril (Sulindac)  Indocin (Indomethacin)  Lodine (Etodolac)  Vicoprofen (Hydrocodone and Ibuprofen)  Apixaban (Eliquis)    4 DAY HOLD  Mobic (Meloxicam)  Naprosyn (Naproxen)   7 DAY HOLD  Darvon compound (contains aspirin)  Norgesic Forte (contains aspirin)  Oruvall (Ketoprofen)  Percodan (contains aspirin)  Relafen (Nabumetone)  Salsalate  Trilisate  Vitamin E (more than 400 mg per day)  Any medication containing aspirin    14 DAY HOLD  Daypro (Oxaprozin)  Feldene (Piroxicam)

## 2025-01-30 NOTE — LETTER
1/30/2025       RE: Ericka Lyman  [address]  [facility] [address]     Dear Colleague,    Thank you for referring your patient, Ericka Lyman, to the [facility] [place] [facility] [place] at [facility]. Please see a copy of my visit note below.    [facility] : Progress Note    Date of visit: 1/30/2025    Chief Complaint   Patient presents with     RECHECK     Back Pain     Follow up-needs another eval         The patient has a medical history significant for spondylosis of the lumbar spine, lower back pain, lumbar radiculopathy, osteopenia, and hyperlipidemia.  She developed back pain and had 4 steroid injections ( L4-L5 transforaminal epidural steroid injection) in her back in February and August.  Injection did help her pain.   Pain never goes below knee.  Pain bilateral (left greater than right). Since August 2023 pain are slowly getting worse.  She developed some weakness in the left leg but no bowel or bladder incontinence.  She walks with a cane.  Her MRI of the lumbosacral spine reviewed.  Patient has pronounced degenerative changes in the lumbar and lower thoracic spine including loss of disc dehydration, disc bulging osteophyte formation at the disc and hypertrophic arthritic changes at the facet joints.  She also has ligamentum flavum thickening.  There is spinal stenosis pronounced at the L4-L5.  And grade 1 spondylolisthesis at L5-S1.  70% of his her pain is back and 30% the leg.      Interval history:    Patient notes that she has been doing well.  She has been having lumbar epidural steroid injection every 3 months.  Last 2 injections were performed by me with good pain relief.  As her pain is slowly coming back she wants to repeat the injection.  She does not have any other additional questions.    [place] Prescription Monitoring Program:    Reviewed. No concerns     Review of Systems:  The 14 system ROS was reviewed and was negative except what is documented above and as follows.  Any bowel or bladder problems: none  Mood: okay    Physical Exam:  Vitals:    01/30/25 0944   BP: 131/77   Pulse: 77   Resp: 16   SpO2: 97%       General: Awake in no apparent distress.   Eyes: Sclerae are anicteric. PERRLA, EOMI   Neck: supple, no masses.   Lungs: unlabored.   Heart: regular rate and rhythm   Abdomen: soft non tender.  Extremities: Pulses are well palpable, no peripheral edema.   Musculoskeletal: 5/5 muscle strength in all extremities.   Neurologic exam:Sensation intact throughout all dermatomes bilateral upper extremities and lower extremities  Psychiatric; Normal affect.   Skin: Warm and Dry.    Medications:  Current Outpatient Medications   Medication Sig Dispense Refill     Ascorbic Acid (VITAMIN C) 500 MG CAPS Take 500 mg by mouth daily 90 capsule 3     calcium citrate-vitamin D (CITRACAL) 200-6.25 MG-MCG TABS per tablet Take 1 tablet by mouth 4 times daily. Calcium Citrate at 1000 MG Total - 77% Vitamin D at 2000 international unit(s) - 250% 360 tablet 3     fish oil-omega-3 fatty acids 1000 MG capsule Take 2 capsules (2 g) by mouth daily. 180 capsule 3     fluticasone (FLONASE) 50 MCG/ACT nasal spray Spray 1 spray into both nostrils daily as needed for rhinitis or allergies 11.1 mL 2     losartan (COZAAR) 25 MG tablet Take 0.5 tablets (12.5 mg) by mouth daily. 45 tablet 3     meloxicam (MOBIC) 7.5 MG tablet Take 1 tablet (7.5 mg) by mouth daily as needed for moderate pain. 90 tablet 1     multivitamin w/minerals (THERA-VIT-M) tablet Take 1 tablet by mouth daily Generic 90 tablet 3     omeprazole (PRILOSEC) 40 MG DR capsule TAKE 1 CAPSULE BY MOUTH EVERY DAY 90 capsule 2     simvastatin (ZOCOR) 20 MG tablet Take 1 tablet (20 mg) by mouth at bedtime. 90 tablet 3       Analgesic Medications:   Medications related to Pain Management (From now, onward)       None               LABORATORY VALUES:   Recent Labs   Lab Test 06/03/24  0907 12/11/23  0904    135   POTASSIUM 4.4 4.2   CHLORIDE 102 100   CO2 25 26   ANIONGAP 9 9   GLC 99 94   BUN 11.9 13.9   CR 0.73 0.73   THAIS 9.7 10.0       CBC RESULTS:   Recent Labs   Lab Test 05/07/24  1526   WBC 5.4   RBC 4.59   HGB 13.8   HCT 38.8   MCV 85   MCH 30.1   MCHC 35.6   RDW 12.3          Most Recent 3 INR's:No lab results found.        ASSESSMENT:       Diagnoses         Codes Comments    Lumbar radiculopathy, chronic    -  Primary M54.16     Spondylosis of lumbar spine     M47.816             PLAN:    1. Medications.      Meloxicam 7.5 mg daily as needed  500 mg 4 times daily as needed     - Interventional procedures:  Ordered  lumbar epidural steroid injection.  Risk of the procedure including bleeding, infection, failure procedure discussed with the patient.     - Labs and imaging: None needed    - Rehab: The patient is also encouraged to stay active as tolerated. PT referral for core-strengthening exercises to relieve back pain include the pelvic tilt, cat-cow pose, bird dog, high and low planks, crunches, and exercises performed using a Swiss ball (or exercise ball).        - Psychology: No current needs.     - Integrated medicine: None needed     - Disposition:   We will see the patient for above mentioned procedure.     Assessment will be ongoing with changes in treatment as indicated.  Benefits/risks/alternatives to treatment have been reviewed and the patient has been instructed to contact this office if they have any questions or concerns.  This plan of care has been discussed with the patient and the patient is in agreement.     Omid Escobar MD, PHD      Again, thank you for allowing me to participate in the care of your patient.      Sincerely,    Omid Escobar MD

## 2025-02-13 ENCOUNTER — ANCILLARY PROCEDURE (OUTPATIENT)
Dept: RADIOLOGY | Facility: AMBULATORY SURGERY CENTER | Age: 80
End: 2025-02-13
Attending: ANESTHESIOLOGY
Payer: MEDICARE

## 2025-02-13 DIAGNOSIS — M54.16 LUMBAR RADICULOPATHY, CHRONIC: ICD-10-CM

## 2025-03-12 ENCOUNTER — OFFICE VISIT (OUTPATIENT)
Dept: OTOLARYNGOLOGY | Facility: CLINIC | Age: 80
End: 2025-03-12
Attending: PHYSICIAN ASSISTANT
Payer: MEDICARE

## 2025-03-12 VITALS
DIASTOLIC BLOOD PRESSURE: 86 MMHG | HEART RATE: 87 BPM | OXYGEN SATURATION: 98 % | WEIGHT: 139.2 LBS | SYSTOLIC BLOOD PRESSURE: 142 MMHG | HEIGHT: 59 IN | BODY MASS INDEX: 28.06 KG/M2

## 2025-03-12 DIAGNOSIS — H61.23 BILATERAL IMPACTED CERUMEN: Primary | ICD-10-CM

## 2025-03-12 ASSESSMENT — PAIN SCALES - GENERAL: PAINLEVEL_OUTOF10: NO PAIN (0)

## 2025-03-12 NOTE — LETTER
3/12/2025       RE: Ericka Lyman  [address]  [facility] [address]     Dear Colleague,    Thank you for referring your patient, Ericka Lyman, to the [facility] E[facility] NO[facility]AT [facility]INIC [place] at [facility]. Please see a copy of my visit note below.        Patient:  Ericka Lyman, Date of birth 1945  Date of Visit:  Mar 12, 2025    HPI:  Ericka Lyman is here for cerumen removal.    Patient denies any new hearing loss.   Patient reports plugged sensation in ears     Otologic microscope exam:   Patient's ear pathology required use of the binocular microscope for the purpose of cleaning and improving visualization in order to assure a more accurate diagnostic evaluation.     Right ear was examined under the microscope.  Cerumen impaction noted. It was cleaned with suction. Once cleaned, TM visualized under microscope. Normal appearing TM, nicely aerated middle ear space.      Left ear was also examined under the microscope.  Cerumen impaction noted. It was cleaned with alligator forceps. Once cleaned, TM visualized under microscope. Normal appearing TM, nicely aerated middle ear space.        Assessment and Plan:  Cerumen, bilateral     Patient presents with cerumen impaction of bilateral ears.  The patient's ears are cleaned today.  Healthy exam after cleaning. Return as needed for cleaning.    Saira Mojica PA-C  Otolaryngology  Head & Neck Surgery  [phone]          Again, thank you for allowing me to participate in the care of your patient.      Sincerely,    Saira Mojica PA-C

## 2025-03-12 NOTE — PROGRESS NOTES
Patient:  Ericka Lyman, Date of birth 1945  Date of Visit:  Mar 12, 2025    HPI:  Ericka Lyman is here for cerumen removal.    Patient denies any new hearing loss.   Patient reports plugged sensation in ears     Otologic microscope exam:   Patient's ear pathology required use of the binocular microscope for the purpose of cleaning and improving visualization in order to assure a more accurate diagnostic evaluation.     Right ear was examined under the microscope.  Cerumen impaction noted. It was cleaned with suction. Once cleaned, TM visualized under microscope. Normal appearing TM, nicely aerated middle ear space.      Left ear was also examined under the microscope.  Cerumen impaction noted. It was cleaned with alligator forceps. Once cleaned, TM visualized under microscope. Normal appearing TM, nicely aerated middle ear space.        Assessment and Plan:  Cerumen, bilateral     Patient presents with cerumen impaction of bilateral ears.  The patient's ears are cleaned today.  Healthy exam after cleaning. Return as needed for cleaning.    Saira Mojica PA-C  Otolaryngology  Head & Neck Surgery  [phone]

## 2025-03-12 NOTE — PATIENT INSTRUCTIONS
You were seen in the ENT Clinic today by Saira Mojica. If you have any questions or concerns after your appointment, please contact us (see below)      Please return to clinic in 6 months    How to Contact Us:  Send a [employer] message to your provider. Our team will respond to you via [employer]. Occasionally, we will need to call you to get further information.  For urgent matters (Monday-Friday), call the ENT Clinic: [phone] and speak with a call center team member - they will route your call appropriately.   If you'd like to speak directly with a nurse, please find our contact information below. We do our best to check voicemail frequently throughout the day, and will work to call you back within 1-2 days. For urgent matters, please use the general clinic phone numbers listed above.      Debbie PAREKH LPN  Direct: [phone]

## 2025-03-12 NOTE — NURSING NOTE
"Chief Complaint   Patient presents with    RECHECK   Blood pressure (!) 142/86, pulse 87, height 1.486 m (4' 10.5\"), weight 63.1 kg (139 lb 3.2 oz), SpO2 98%. Oskar Boone, EMT    "

## 2025-03-17 ENCOUNTER — ANCILLARY PROCEDURE (OUTPATIENT)
Dept: MAMMOGRAPHY | Facility: CLINIC | Age: 80
End: 2025-03-17
Attending: HOSPITALIST
Payer: MEDICARE

## 2025-03-17 DIAGNOSIS — Z12.31 VISIT FOR SCREENING MAMMOGRAM: ICD-10-CM

## 2025-04-08 ENCOUNTER — OFFICE VISIT (OUTPATIENT)
Dept: DERMATOLOGY | Facility: CLINIC | Age: 80
End: 2025-04-08
Attending: PHYSICIAN ASSISTANT
Payer: MEDICARE

## 2025-04-08 DIAGNOSIS — L81.4 LENTIGINES: ICD-10-CM

## 2025-04-08 DIAGNOSIS — L57.0 ACTINIC KERATOSES: ICD-10-CM

## 2025-04-08 DIAGNOSIS — Z12.83 ENCOUNTER FOR SCREENING FOR MALIGNANT NEOPLASM OF SKIN: Primary | ICD-10-CM

## 2025-04-08 DIAGNOSIS — L82.1 SEBORRHEIC KERATOSES: ICD-10-CM

## 2025-04-08 DIAGNOSIS — D22.9 MULTIPLE NEVI: ICD-10-CM

## 2025-04-08 DIAGNOSIS — D18.01 CHERRY ANGIOMA: ICD-10-CM

## 2025-04-08 PROCEDURE — 99213 OFFICE O/P EST LOW 20 MIN: CPT | Mod: 25 | Performed by: PHYSICIAN ASSISTANT

## 2025-04-08 PROCEDURE — 17003 DESTRUCT PREMALG LES 2-14: CPT | Performed by: PHYSICIAN ASSISTANT

## 2025-04-08 PROCEDURE — 17000 DESTRUCT PREMALG LESION: CPT | Performed by: PHYSICIAN ASSISTANT

## 2025-04-08 PROCEDURE — 1126F AMNT PAIN NOTED NONE PRSNT: CPT | Performed by: PHYSICIAN ASSISTANT

## 2025-04-08 ASSESSMENT — PAIN SCALES - GENERAL: PAINLEVEL_OUTOF10: NO PAIN (0)

## 2025-04-08 NOTE — PROGRESS NOTES
[facility] Dermatology Note  Encounter Date: Apr 8, 2025  Office Visit     Reviewed patients past medical history and pertinent chart review prior to patients visit today.     Dermatology Problem List:  # Actinic keratosis  - cryotherapy     No personal history of skin cancer.  No family history of skin cancer.    ____________________________________________    Assessment & Plan:     #Actinic keratosis x2  - We discussed the precancerous nature of the skin lesions.  I recommended liquid nitrogen cryotherapy and the patient was agreeable.      Cryotherapy procedure note, location(s): left dorsal hand x2 After verbal consent and discussion of risks and benefits including, but not limited to, dyspigmentation/scar, blister, and pain, the lesion(s) was(were) treated with 1-2 mm freeze border for 1-2 cycles with liquid nitrogen. Post cryotherapy instructions were provided.    # Multiple nevi, trunk and extremities  # Solar lentigines  - No concerning features on dermoscopy. We discussed the importance of self exams at home. ABCDE criteria and importance of SPF 30+ sunscreen and photoprotective clothing reviewed.     # Cherry angiomas  # Seborrheic keratoses  - We discussed the benign nature of the skin lesions. No treatment required. Continued observation recommended. Follow up with any concerns.      Follow-up:  Annual for follow up full body skin exam, as needed for new or changing lesions or new concerns    All risks, benefits and alternatives were discussed with patient.  Patient is in agreement and understands the assessment and plan.  All questions were answered.  Tamika Ambrose PA-C  [facility] Dermatology    ____________________________________________    CC: Skin Check (Annual FBSE)    HPI:  Ms. Ericka Lyman is a(n) 79 year old female who presents today as a return patient for a full body skin cancer screening. No specific cutaneous concerns today. The patient reports trying to be  diligent with photoprotection.      Physical Exam:  Vitals: There were no vitals taken for this visit.  SKIN: Total skin excluding the genitalia areas was performed. The exam included the scalp, face, neck, bilateral arms, chest, back, abdomen, bilateral legs, digits, mons pubis, buttocks, and nails.   - Jay II.  - Pink macule(s) with gritty scale involving the left dorsal hand, consistent with actinic keratosis.   - Multiple tan/brown macules and papules scattered throughout exam, consistent with benign nevi. No concerning features on dermoscopy.   - Scattered tan, homogenous macules scattered on sun exposed skin, consistent with solar lentigines.   - Scattered waxy, stuck on appearing papules and patches, consistent with seborrheic keratoses.    - Several 1-2 mm red dome shaped symmetric papules, consistent with cherry angiomas.     Medications:  Current Outpatient Medications   Medication Sig Dispense Refill    Ascorbic Acid (VITAMIN C) 500 MG CAPS Take 500 mg by mouth daily 90 capsule 3    calcium citrate-vitamin D (CITRACAL) 200-6.25 MG-MCG TABS per tablet Take 1 tablet by mouth 4 times daily. Calcium Citrate at 1000 MG Total - 77% Vitamin D at 2000 international unit(s) - 250% 360 tablet 3    fish oil-omega-3 fatty acids 1000 MG capsule Take 2 capsules (2 g) by mouth daily. 180 capsule 3    fluticasone (FLONASE) 50 MCG/ACT nasal spray Spray 1 spray into both nostrils daily as needed for rhinitis or allergies 11.1 mL 2    losartan (COZAAR) 25 MG tablet Take 0.5 tablets (12.5 mg) by mouth daily. 45 tablet 3    meloxicam (MOBIC) 7.5 MG tablet Take 1 tablet (7.5 mg) by mouth daily as needed for moderate pain. 90 tablet 1    multivitamin w/minerals (THERA-VIT-M) tablet Take 1 tablet by mouth daily Generic 90 tablet 3    omeprazole (PRILOSEC) 40 MG DR capsule TAKE 1 CAPSULE BY MOUTH EVERY DAY 90 capsule 2    simvastatin (ZOCOR) 20 MG tablet Take 1 tablet (20 mg) by mouth at bedtime. 90 tablet 3     No  current facility-administered medications for this visit.      Past Medical History:   Patient Active Problem List   Diagnosis    Need for Tdap vaccination    Hyperlipidemia, unspecified hyperlipidemia type    Actinic keratosis    Spondylosis of lumbar spine    Lumbar radiculopathy, chronic    Primary hypertension    Lumbar radiculopathy    Pain in both wrists    Age-related osteoporosis without current pathological fracture     Past Medical History:   Diagnosis Date    Actinic keratosis     Gastroesophageal reflux disease without esophagitis     HLD (hyperlipidemia)     HTN (hypertension)     Osteoporosis     Seasonal allergies     Spring to Fall    Spondylosis of lumbosacral spine with radiculopathy        CC Tamika Ambrose PA-C  [facility]  [address] [place] [phone] on close of this encounter.

## 2025-04-08 NOTE — NURSING NOTE
Dermatology Rooming Note    Ericka Lyman's goals for this visit include:   Chief Complaint   Patient presents with    Skin Check     Annual FBSE     Cesia Nails LPN

## 2025-04-08 NOTE — LETTER
4/8/2025       RE: Ericka Lyman  [address]  [facility] [address]     Dear Colleague,    Thank you for referring your patient, Ericka Lyman, to the [facility]MAT[facility] [place] [facility]. Please see a copy of my visit note below.    [facility] Dermatology Note  Encounter Date: Apr 8, 2025  Office Visit     Reviewed patients past medical history and pertinent chart review prior to patients visit today.     Dermatology Problem List:  # Actinic keratosis  - cryotherapy     No personal history of skin cancer.  No family history of skin cancer.    ____________________________________________    Assessment & Plan:     #Actinic keratosis x2  - We discussed the precancerous nature of the skin lesions.  I recommended liquid nitrogen cryotherapy and the patient was agreeable.      Cryotherapy procedure note, location(s): left dorsal hand x2 After verbal consent and discussion of risks and benefits including, but not limited to, dyspigmentation/scar, blister, and pain, the lesion(s) was(were) treated with 1-2 mm freeze border for 1-2 cycles with liquid nitrogen. Post cryotherapy instructions were provided.    # Multiple nevi, trunk and extremities  # Solar lentigines  - No concerning features on dermoscopy. We discussed the importance of self exams at home. ABCDE criteria and importance of SPF 30+ sunscreen and photoprotective clothing reviewed.     # Cherry angiomas  # Seborrheic keratoses  - We discussed the benign nature of the skin lesions. No treatment required. Continued observation recommended. Follow up with any concerns.      Follow-up:  Annual for follow up full body skin exam, as needed for new or changing lesions or new concerns    All risks, benefits and alternatives were discussed with patient.  Patient is in agreement and understands the assessment and plan.  All questions were answered.  Tamika Ambrose,  [facility]    ____________________________________________    CC: Skin Check (Annual FBSE)    HPI:  Ms. Ericka Lyman is a(n) 79 year old female who presents today as a return patient for a full body skin cancer screening. No specific cutaneous concerns today. The patient reports trying to be diligent with photoprotection.      Physical Exam:  Vitals: There were no vitals taken for this visit.  SKIN: Total skin excluding the genitalia areas was performed. The exam included the scalp, face, neck, bilateral arms, chest, back, abdomen, bilateral legs, digits, mons pubis, buttocks, and nails.   - Jay II.  - Pink macule(s) with gritty scale involving the left dorsal hand, consistent with actinic keratosis.   - Multiple tan/brown macules and papules scattered throughout exam, consistent with benign nevi. No concerning features on dermoscopy.   - Scattered tan, homogenous macules scattered on sun exposed skin, consistent with solar lentigines.   - Scattered waxy, stuck on appearing papules and patches, consistent with seborrheic keratoses.    - Several 1-2 mm red dome shaped symmetric papules, consistent with cherry angiomas.     Medications:  Current Outpatient Medications   Medication Sig Dispense Refill     Ascorbic Acid (VITAMIN C) 500 MG CAPS Take 500 mg by mouth daily 90 capsule 3     calcium citrate-vitamin D (CITRACAL) 200-6.25 MG-MCG TABS per tablet Take 1 tablet by mouth 4 times daily. Calcium Citrate at 1000 MG Total - 77% Vitamin D at 2000 international unit(s) - 250% 360 tablet 3     fish oil-omega-3 fatty acids 1000 MG capsule Take 2 capsules (2 g) by mouth daily. 180 capsule 3     fluticasone (FLONASE) 50 MCG/ACT nasal spray Spray 1 spray into both nostrils daily as needed for rhinitis or allergies 11.1 mL 2     losartan (COZAAR) 25 MG tablet Take 0.5 tablets (12.5 mg) by mouth daily. 45 tablet 3     meloxicam (MOBIC) 7.5 MG tablet Take 1 tablet (7.5 mg) by mouth daily as  needed for moderate pain. 90 tablet 1     multivitamin w/minerals (THERA-VIT-M) tablet Take 1 tablet by mouth daily Generic 90 tablet 3     omeprazole (PRILOSEC) 40 MG DR capsule TAKE 1 CAPSULE BY MOUTH EVERY DAY 90 capsule 2     simvastatin (ZOCOR) 20 MG tablet Take 1 tablet (20 mg) by mouth at bedtime. 90 tablet 3     No current facility-administered medications for this visit.      Past Medical History:   Patient Active Problem List   Diagnosis     Need for Tdap vaccination     Hyperlipidemia, unspecified hyperlipidemia type     Actinic keratosis     Spondylosis of lumbar spine     Lumbar radiculopathy, chronic     Primary hypertension     Lumbar radiculopathy     Pain in both wrists     Age-related osteoporosis without current pathological fracture     Past Medical History:   Diagnosis Date     Actinic keratosis      Gastroesophageal reflux disease without esophagitis      HLD (hyperlipidemia)      HTN (hypertension)      Osteoporosis      Seasonal allergies     Spring to Fall     Spondylosis of lumbosacral spine with radiculopathy        CC Tamika Ambrose PA-C   Dermatology  [address] [place], [place] [id number] on close of this encounter.      Again, thank you for allowing me to participate in the care of your patient.      Sincerely,    Tamika Ambrose PA-C

## 2025-04-08 NOTE — PATIENT INSTRUCTIONS
Patient Education        Proper skin care from [facility]:     -Eliminate harsh soaps as they strip the natural oils from the skin, often resulting in dry itchy skin ( i.e. Dial, Zest, [place] Spring)  -Use mild soaps such as Cetaphil or Dove Sensitive Skin in the shower. You do not need to use soap on arms, legs, and trunk every time you shower unless visibly soiled.   -Avoid hot or cold showers.  -After showering, lightly dry off and apply moisturizing within 2-3 minutes. This will help trap moisture in the skin.   -Aggressive use of a moisturizer at least 1-2 times a day to the entire body (including -Vanicream, Cetaphil, Aquaphor or Cerave) and moisturize hands after every washing.  -We recommend using moisturizers that come in a tub that needs to be scooped out, not a pump. This has more of an oil base. It will hold moisture in your skin much better than a water base moisturizer. The above recommended are non-pore clogging.        Wear a sunscreen with at least SPF 30 on your face, ears, neck and V of the chest daily. Wear sunscreen on other areas of the body if those areas are exposed to the sun throughout the day. Sunscreens can contain physical and/or chemical blockers. Physical blockers are less likely to clog pores, these include zinc oxide and titanium dioxide. Reapply every two hour and after swimming.      Sunscreen examples: https://www.ewg.org/sunscreen/     UV radiation  UVA radiation remains constant throughout the day and throughout the year. It is a longer wavelength than UVB and therefore penetrates deeper into the skin leading to immediate and delayed tanning, photoaging, and skin cancer. 70-80% of UVA and UVB radiation occurs between the hours of 10am-2pm.  UVB radiation  UVB radiation causes the most harmful effects and is more significant during the summer months. However, snow and ice can reflect UVB radiation leading to skin damage during the winter months as well. UVB radiation is  responsible for tanning, burning, inflammation, delayed erythema (pinkness), pigmentation (brown spots), and skin cancer.      I recommend self monthly full body exams and yearly full body exams with a dermatology provider. If you develop a new or changing lesion please follow up for examination. Most skin cancers are pink and scaly or pink and pearly. However, we do see blue/brown/black skin cancers.  Consider the ABCDEs of melanoma when giving yourself your monthly full body exam ( don't forget the groin, buttocks, feet, toes, etc). A-asymmetry, B-borders, C-color, D-diameter, E-elevation or evolving. If you see any of these changes please follow up in clinic. If you cannot see your back I recommend purchasing a hand held mirror to use with a larger wall mirror.       Checking for Skin Cancer  You can find cancer early by checking your skin each month. There are 3 kinds of skin cancer. They are melanoma, basal cell carcinoma, and squamous cell carcinoma. Doing monthly skin checks is the best way to find new marks or skin changes. Follow the instructions below for checking your skin.   The ABCDEs of checking moles for melanoma   Check your moles or growths for signs of melanoma using ABCDE:   Asymmetry: the sides of the mole or growth don t match  Border: the edges are ragged, notched, or blurred  Color: the color within the mole or growth varies  Diameter: the mole or growth is larger than 6 mm (size of a pencil eraser)  Evolving: the size, shape, or color of the mole or growth is changing (evolving is not shown in the images below)    Checking for other types of skin cancer  Basal cell carcinoma or squamous cell carcinoma have symptoms such as:      A spot or mole that looks different from all other marks on your skin  Changes in how an area feels, such as itching, tenderness, or pain  Changes in the skin's surface, such as oozing, bleeding, or scaliness  A sore that does not heal  New swelling or redness beyond  the border of a mole     Who s at risk?  Anyone can get skin cancer. But you are at greater risk if you have:   Fair skin, light-colored hair, or light-colored eyes  Many moles or abnormal moles on your skin  A history of sunburns from sunlight or tanning beds  A family history of skin cancer  A history of exposure to radiation or chemicals  A weakened immune system  If you have had skin cancer in the past, you are at risk for recurring skin cancer.   How to check your skin  Do your monthly skin checkups in front of a full-length mirror. Check all parts of your body, including your:   Head (ears, face, neck, and scalp)  Torso (front, back, and sides)  Arms (tops, undersides, upper, and lower armpits)  Hands (palms, backs, and fingers, including under the nails)  Buttocks and genitals  Legs (front, back, and sides)  Feet (tops, soles, toes, including under the nails, and between toes)  If you have a lot of moles, take digital photos of them each month. Make sure to take photos both up close and from a distance. These can help you see if any moles change over time.   Most skin changes are not cancer. But if you see any changes in your skin, call your doctor right away. Only he or she can diagnose a problem. If you have skin cancer, seeing your doctor can be the first step toward getting the treatment that could save your life.   [employer] last reviewed this educational content on 4/1/2019 2000-2020 [employer]. [address] [place] [address], [place] [address]. All rights reserved. This information is not intended as a substitute for professional medical care. Always follow your healthcare professional's instructions.        When should I call my doctor?  If you are worsening or not improving, please, contact us or seek urgent care as noted below.      Who should I call with questions (adults)?  [facility] (adult and pediatric): [phone]  [facility]  [place] (adult): [phone]  [facility] ([place], [place], [place] and [place]) [phone]  For urgent needs outside of business hours call the [facility] at [phone] and ask for the dermatology resident on call to be paged  If this is a medical emergency and you are unable to reach an ER, Call 911        If you need a prescription refill, please contact your pharmacy. Refills are approved or denied by our Physicians during normal business hours, Monday through Fridays  Per office policy, refills will not be granted if you have not been seen within the past year (or sooner depending on your child's condition)

## 2025-04-11 ENCOUNTER — ANCILLARY PROCEDURE (OUTPATIENT)
Dept: MAMMOGRAPHY | Facility: CLINIC | Age: 80
End: 2025-04-11
Attending: HOSPITALIST
Payer: MEDICARE

## 2025-04-11 DIAGNOSIS — Z12.31 VISIT FOR SCREENING MAMMOGRAM: ICD-10-CM

## 2025-04-11 PROCEDURE — 77067 SCR MAMMO BI INCL CAD: CPT | Performed by: STUDENT IN AN ORGANIZED HEALTH CARE EDUCATION/TRAINING PROGRAM

## 2025-04-11 PROCEDURE — 77063 BREAST TOMOSYNTHESIS BI: CPT | Performed by: STUDENT IN AN ORGANIZED HEALTH CARE EDUCATION/TRAINING PROGRAM

## 2025-04-27 ENCOUNTER — HEALTH MAINTENANCE LETTER (OUTPATIENT)
Age: 80
End: 2025-04-27

## 2025-06-08 DIAGNOSIS — M47.816 SPONDYLOSIS OF LUMBAR SPINE: ICD-10-CM

## 2025-06-10 RX ORDER — MELOXICAM 7.5 MG/1
7.5 TABLET ORAL DAILY PRN
Qty: 90 TABLET | Refills: 0 | Status: SHIPPED | OUTPATIENT
Start: 2025-06-10

## 2025-07-14 ENCOUNTER — OFFICE VISIT (OUTPATIENT)
Dept: INTERNAL MEDICINE | Facility: CLINIC | Age: 80
End: 2025-07-14
Payer: MEDICARE

## 2025-07-14 ENCOUNTER — LAB (OUTPATIENT)
Dept: LAB | Facility: CLINIC | Age: 80
End: 2025-07-14
Payer: MEDICARE

## 2025-07-14 VITALS
SYSTOLIC BLOOD PRESSURE: 127 MMHG | HEART RATE: 76 BPM | RESPIRATION RATE: 16 BRPM | OXYGEN SATURATION: 98 % | BODY MASS INDEX: 28.26 KG/M2 | WEIGHT: 140.2 LBS | HEIGHT: 59 IN | TEMPERATURE: 98.5 F | DIASTOLIC BLOOD PRESSURE: 77 MMHG

## 2025-07-14 DIAGNOSIS — E78.5 HYPERLIPIDEMIA, UNSPECIFIED HYPERLIPIDEMIA TYPE: ICD-10-CM

## 2025-07-14 DIAGNOSIS — M81.0 AGE-RELATED OSTEOPOROSIS WITHOUT CURRENT PATHOLOGICAL FRACTURE: ICD-10-CM

## 2025-07-14 DIAGNOSIS — I10 PRIMARY HYPERTENSION: ICD-10-CM

## 2025-07-14 DIAGNOSIS — M81.0 AGE-RELATED OSTEOPOROSIS WITHOUT CURRENT PATHOLOGICAL FRACTURE: Primary | ICD-10-CM

## 2025-07-14 LAB
ALBUMIN SERPL BCG-MCNC: 4.6 G/DL (ref 3.5–5.2)
ALP SERPL-CCNC: 72 U/L (ref 40–150)
ALT SERPL W P-5'-P-CCNC: 16 U/L (ref 0–50)
ANION GAP SERPL CALCULATED.3IONS-SCNC: 11 MMOL/L (ref 7–15)
AST SERPL W P-5'-P-CCNC: 23 U/L (ref 0–45)
BILIRUB SERPL-MCNC: 0.3 MG/DL
BUN SERPL-MCNC: 18.8 MG/DL (ref 8–23)
CALCIUM SERPL-MCNC: 10 MG/DL (ref 8.8–10.4)
CHLORIDE SERPL-SCNC: 102 MMOL/L (ref 98–107)
CHOLEST SERPL-MCNC: 181 MG/DL
CREAT SERPL-MCNC: 0.79 MG/DL (ref 0.51–0.95)
EGFRCR SERPLBLD CKD-EPI 2021: 76 ML/MIN/1.73M2
FASTING STATUS PATIENT QL REPORTED: YES
FASTING STATUS PATIENT QL REPORTED: YES
GLUCOSE SERPL-MCNC: 91 MG/DL (ref 70–99)
HCO3 SERPL-SCNC: 24 MMOL/L (ref 22–29)
HDLC SERPL-MCNC: 58 MG/DL
LDLC SERPL CALC-MCNC: 109 MG/DL
NONHDLC SERPL-MCNC: 123 MG/DL
POTASSIUM SERPL-SCNC: 4.5 MMOL/L (ref 3.4–5.3)
PROT SERPL-MCNC: 7 G/DL (ref 6.4–8.3)
SODIUM SERPL-SCNC: 137 MMOL/L (ref 135–145)
TRIGL SERPL-MCNC: 69 MG/DL
VIT D+METAB SERPL-MCNC: 55 NG/ML (ref 20–50)

## 2025-07-14 PROCEDURE — 82306 VITAMIN D 25 HYDROXY: CPT | Performed by: HOSPITALIST

## 2025-07-14 PROCEDURE — 36415 COLL VENOUS BLD VENIPUNCTURE: CPT | Performed by: PATHOLOGY

## 2025-07-14 PROCEDURE — 99213 OFFICE O/P EST LOW 20 MIN: CPT | Performed by: HOSPITALIST

## 2025-07-14 PROCEDURE — 80053 COMPREHEN METABOLIC PANEL: CPT | Performed by: PATHOLOGY

## 2025-07-14 PROCEDURE — 80061 LIPID PANEL: CPT | Performed by: PATHOLOGY

## 2025-07-14 PROCEDURE — 3078F DIAST BP <80 MM HG: CPT | Performed by: HOSPITALIST

## 2025-07-14 PROCEDURE — 99000 SPECIMEN HANDLING OFFICE-LAB: CPT | Performed by: PATHOLOGY

## 2025-07-14 PROCEDURE — 3074F SYST BP LT 130 MM HG: CPT | Performed by: HOSPITALIST

## 2025-07-14 NOTE — ASSESSMENT & PLAN NOTE
Patient on hold from bisphosphonates since 5/2024 for a 2 year holiday. Last dexa scan was 5/2024 with note of T score of negative 3 at radius.   - Recheck vitamin D level.   - Continued on calcium/vitamin D.   - Plan to recheck dexa scan again in 2026.   - Patient to continue weekly exercises.

## 2025-07-14 NOTE — PATIENT INSTRUCTIONS
- Continue current medications.   - Will watch for vitamin D level.   - Recheck Dexa scan next year again.       Follow up again in 1 year or as needed.

## 2025-07-14 NOTE — ASSESSMENT & PLAN NOTE
Cholesterol remains stable. LDL at 109 today.   - Will continue on simvastatin 20mg daily.   - Patient to continue exercises during the week.

## 2025-07-14 NOTE — PROGRESS NOTES
"  Assessment & Plan   Problem List Items Addressed This Visit       Primary hypertension    Controlled. Potassium level normal on labs today.   - Continued on losartan 12.5mg daily.          Hyperlipidemia, unspecified hyperlipidemia type    Cholesterol remains stable. LDL at 109 today.   - Will continue on simvastatin 20mg daily.   - Patient to continue exercises during the week.          Age-related osteoporosis without current pathological fracture - Primary    Patient on hold from bisphosphonates since 5/2024 for a 2 year holiday. Last dexa scan was 5/2024 with note of T score of negative 3 at radius.   - Recheck vitamin D level.   - Continued on calcium/vitamin D.   - Plan to recheck dexa scan again in 2026.   - Patient to continue weekly exercises.          Relevant Orders    Vitamin D Deficiency        BMI  Estimated body mass index is 28.8 kg/m  as calculated from the following:    Height as of this encounter: 1.486 m (4' 10.5\").    Weight as of this encounter: 63.6 kg (140 lb 3.2 oz).       Follow-up  Return in about 1 year (around 7/14/2026).    Monika Barnett is a 79 year old, presenting for the following health issues:  Follow Up (Back treatment and bone density)      7/14/2025     9:05 AM   Additional Questions   Roomed by Smita NELSON   Accompanied by N/A     History of Present Illness       Back Pain:  She presents for follow up of back pain. Patient's back pain is a chronic problem.  Location of back pain:  Right lower back and left lower back  Description of back pain: dull ache  Back pain spreads: left buttocks    Since patient first noticed back pain, pain is: always present, but gets better and worse  Does back pain interfere with her job:  Not applicable       Hyperlipidemia:  She presents for follow up of hyperlipidemia.   She is taking medication to lower cholesterol. She is not having myalgia or other side effects to statin medications.    Hypertension: She presents for follow up of " "hypertension.  She does check blood pressure  regularly outside of the clinic. Outpatient blood pressures have not been over 140/90. She follows a low salt diet.     She eats 4 or more servings of fruits and vegetables daily.She consumes 0 sweetened beverage(s) daily.She exercises with enough effort to increase her heart rate 20 to 29 minutes per day.  She exercises with enough effort to increase her heart rate 6 days per week.   She is taking medications regularly.        No dizziness. Has 4.5 hours a week of exercises at the [facility]. Does cardiovascular and resistance exercising with stretches. Feels guilty when not doing the exercises.       Review of Systems  Constitutional, neuro, ENT, endocrine, pulmonary, cardiac, gastrointestinal, genitourinary, musculoskeletal, integument and psychiatric systems are negative, except as otherwise noted.      Objective    /77 (BP Location: Right arm, Patient Position: Sitting, Cuff Size: Adult Regular)   Pulse 76   Temp 98.5  F (36.9  C)   Resp 16   Ht 1.486 m (4' 10.5\")   Wt 63.6 kg (140 lb 3.2 oz)   SpO2 98%   BMI 28.80 kg/m    Body mass index is 28.8 kg/m .  Physical Exam   GENERAL: alert and no distress  EYES: Eyes grossly normal to inspection, and conjunctivae and sclerae normal  RESP: lungs clear to auscultation - no rales, rhonchi or wheezes  CV: regular rate and rhythm, normal S1 S2, no S3 or S4, no murmur, click or rub, no peripheral edema  MS: no gross musculoskeletal defects noted, no edema  SKIN: no suspicious lesions or rashes  NEURO: Normal strength and tone, mentation intact and speech normal  PSYCH: mentation appears normal, affect normal/bright            Signed Electronically by: Fredi Gutierrez MD    "

## 2025-07-22 ENCOUNTER — OFFICE VISIT (OUTPATIENT)
Dept: OPHTHALMOLOGY | Facility: CLINIC | Age: 80
End: 2025-07-22
Payer: MEDICARE

## 2025-07-22 DIAGNOSIS — H52.13 MYOPIC ASTIGMATISM OF BOTH EYES: ICD-10-CM

## 2025-07-22 DIAGNOSIS — H25.13 NUCLEAR SCLEROTIC CATARACT OF BOTH EYES: ICD-10-CM

## 2025-07-22 DIAGNOSIS — H04.123 DRY EYES, BILATERAL: ICD-10-CM

## 2025-07-22 DIAGNOSIS — H40.003 GLAUCOMA SUSPECT OF BOTH EYES: Primary | ICD-10-CM

## 2025-07-22 DIAGNOSIS — H52.203 MYOPIC ASTIGMATISM OF BOTH EYES: ICD-10-CM

## 2025-07-22 DIAGNOSIS — H52.4 PRESBYOPIA OF BOTH EYES: ICD-10-CM

## 2025-07-22 LAB
OPHTH MEAN DEVIATION LEFT EYE: -3.2 DB
OPHTH MEAN DEVIATION RIGHT EYE: -4.1

## 2025-07-22 PROCEDURE — 92015 DETERMINE REFRACTIVE STATE: CPT | Mod: GY | Performed by: OPHTHALMOLOGY

## 2025-07-22 PROCEDURE — 92014 COMPRE OPH EXAM EST PT 1/>: CPT | Performed by: OPHTHALMOLOGY

## 2025-07-22 PROCEDURE — 92083 EXTENDED VISUAL FIELD XM: CPT | Performed by: OPHTHALMOLOGY

## 2025-07-22 PROCEDURE — 92133 CPTRZD OPH DX IMG PST SGM ON: CPT | Performed by: OPHTHALMOLOGY

## 2025-07-22 ASSESSMENT — CONF VISUAL FIELD
OD_INFERIOR_TEMPORAL_RESTRICTION: 0
OS_SUPERIOR_TEMPORAL_RESTRICTION: 0
METHOD: COUNTING FINGERS
OS_NORMAL: 1
OD_NORMAL: 1
OD_SUPERIOR_NASAL_RESTRICTION: 0
OD_SUPERIOR_TEMPORAL_RESTRICTION: 0
OS_INFERIOR_NASAL_RESTRICTION: 0
OD_INFERIOR_NASAL_RESTRICTION: 0
OS_INFERIOR_TEMPORAL_RESTRICTION: 0
OS_SUPERIOR_NASAL_RESTRICTION: 0

## 2025-07-22 ASSESSMENT — REFRACTION_MANIFEST
OS_SPHERE: -1.50
OD_SPHERE: -1.00
OD_CYLINDER: +3.00
OS_CYLINDER: +3.50
OD_AXIS: 009
OS_ADD: +2.75
OD_ADD: +2.75
OS_AXIS: 178

## 2025-07-22 ASSESSMENT — REFRACTION_WEARINGRX
SPECS_TYPE: TRIFOCAL
OS_ADD: +2.75
OS_CYLINDER: +3.25
OD_AXIS: 012
OD_SPHERE: -1.75
OS_SPHERE: -1.75
OS_AXIS: 178
OD_CYLINDER: +3.00
OD_ADD: +2.75

## 2025-07-22 ASSESSMENT — VISUAL ACUITY
OD_CC: 20/20
OS_CC: 20/20
OD_CC+: -2
OS_CC+: -2
CORRECTION_TYPE: GLASSES
METHOD: SNELLEN - LINEAR

## 2025-07-22 ASSESSMENT — TONOMETRY
OS_IOP_MMHG: 16
OD_IOP_MMHG: 15
IOP_METHOD: APPLANATION
OD_IOP_MMHG: 17
OS_IOP_MMHG: 17
IOP_METHOD: TONOPEN

## 2025-07-22 ASSESSMENT — EXTERNAL EXAM - RIGHT EYE: OD_EXAM: NORMAL

## 2025-07-22 ASSESSMENT — EXTERNAL EXAM - LEFT EYE: OS_EXAM: NORMAL

## 2025-07-22 ASSESSMENT — CUP TO DISC RATIO
OS_RATIO: 0.45
OD_RATIO: 0.55

## 2025-07-22 ASSESSMENT — SLIT LAMP EXAM - LIDS
COMMENTS: NORMAL
COMMENTS: NORMAL

## 2025-07-22 NOTE — PROGRESS NOTES
"HPI  Ericka Lyman is a 80 year old female here for comprehensive eye exam.   HPI       COMPREHENSIVE EYE EXAM    In both eyes.  This started 1 month ago.  Characterized as blurred vision.  Context:  near vision.  Since onset it is gradually worsening.  Associated symptoms include dryness.  Negative for eye pain, flashes and floaters.  Treatments tried include eye drops.  Pain was noted as 0/10. Additional comments: CE with glc testing             Comments    AT each eye prn  \"I am wondering how my cataracts are\"    Niru Hooks COT July 22, 2025 3:01 PM            Last edited by Niru Hooks on 7/22/2025  3:01 PM.             PMH:   Past Medical History:   Diagnosis Date    Actinic keratosis     Arthritis     Gastroesophageal reflux disease without esophagitis     HLD (hyperlipidemia)     HTN (hypertension)     Osteoporosis     Seasonal allergies     Spring to Fall    Spondylosis of lumbosacral spine with radiculopathy       POH: Glasses for myopia, glaucoma suspect based on optic disc cupping asymmetry, cataracts, no surgery, no trauma  Oc Meds: artificial tear drops as needed   FH: mother with glaucoma secondary to trauma, denies any glaucoma, age related macular degeneration, or other known eye diseases         Assessment & Plan     1. Glaucoma suspect of both eyes - Both Eyes    2. Nuclear sclerotic cataract of both eyes - Both Eyes    3. Presbyopia of both eyes - Both Eyes    4. Myopic astigmatism of both eyes - Both Eyes    5. Dry eyes, bilateral - Both Eyes      (H40.003) Glaucoma suspect of both eyes - Both Eyes  (primary encounter diagnosis)  Comment:  optic disc cupping asymmetry   K pachy:   repeat   Tmax:  20s per old notes     HVF: normal outside clinic     CDR:  0.55/0.45  HRT/OCT:  borderline inferior segment right eye, needs repeat os      FHX of Glc:  mother (traumatic?)     Gonio:  open     Intolerant to: Asthma/COPD:    Steroid Use:  no     Kidney Stones:       Sulfa Allergy:  no   IOP " targets: low 20s  Today's IOP: 22/22      OCT retinal nerve fiber layer - inferotemporal possible thinning versus artifact right eye, left eye borderline possible thinning inferior temporally -stable  Visual field -scattered depressed points more superiorly -patient reports difficulty with first time testing    Plan: Continue observation off drops   Visual field, pachymetry, and RNFL to be repeated 9 to 12 months    (H25.13) Nuclear sclerotic cataract of both eyes - Both Eyes  Comment: early, minimal changes  Plan: new prescription given, observe      (H52.13) Myopia, bilateral - Both Eyes   Presbyopia both eyes  Comment: mild changes from current glasses  With astigmatism    Plan: Refraction done and prescription for glasses given, update as needed      (H04.123) Dry eyes, bilateral - Both Eyes  Comment: mild, no cornea signs  Plan: Continue artificial tear drops four times a day as needed and preservative-free artificial tears if more than 6 x per day      -----------------------------------------------------------------------------------       Patient disposition:   Return in about 1 year (around 7/22/2026) for Comprehensive Exam, OCT nerve (RNFL) OU, Octopus 24-2, pachy ou, glaucoma suspect.     Complete documentation of historical and exam elements from today's encounter can be found in the full encounter summary report (not reduplicated in this progress note). I personally obtained the chief complaint(s) and history of present illness.  I have confirmed and edited as necessary the CC, HPI, PMH/PSH, social history, FMH, ROS, and exam/neuro findings as obtained by the technician or others. I have examined this patient myself and I personally viewed the image(s) and studies listed above and the documentation reflects my findings and interpretation.  I formulated and edited as necessary the assessment and plan and discussed the findings and management plan with the patient and family.     Carrie Weathers MD

## 2025-08-12 ENCOUNTER — ANCILLARY PROCEDURE (OUTPATIENT)
Dept: RADIOLOGY | Facility: AMBULATORY SURGERY CENTER | Age: 80
End: 2025-08-12
Attending: ANESTHESIOLOGY
Payer: MEDICARE

## 2025-08-12 ENCOUNTER — HOSPITAL ENCOUNTER (OUTPATIENT)
Facility: AMBULATORY SURGERY CENTER | Age: 80
Discharge: HOME | End: 2025-08-12
Attending: ANESTHESIOLOGY | Admitting: ANESTHESIOLOGY
Payer: MEDICARE

## 2025-08-12 VITALS
SYSTOLIC BLOOD PRESSURE: 147 MMHG | RESPIRATION RATE: 15 BRPM | HEART RATE: 78 BPM | BODY MASS INDEX: 28.22 KG/M2 | OXYGEN SATURATION: 98 % | WEIGHT: 140 LBS | TEMPERATURE: 97.4 F | DIASTOLIC BLOOD PRESSURE: 77 MMHG | HEIGHT: 59 IN

## 2025-08-12 DIAGNOSIS — M54.16 LUMBAR RADICULOPATHY: ICD-10-CM

## 2025-08-12 PROCEDURE — 62323 NJX INTERLAMINAR LMBR/SAC: CPT

## 2025-08-12 PROCEDURE — 62323 NJX INTERLAMINAR LMBR/SAC: CPT | Performed by: ANESTHESIOLOGY

## 2025-08-12 RX ORDER — LIDOCAINE HYDROCHLORIDE 10 MG/ML
INJECTION, SOLUTION EPIDURAL; INFILTRATION; INTRACAUDAL; PERINEURAL PRN
Status: DISCONTINUED | OUTPATIENT
Start: 2025-08-12 | End: 2025-08-12 | Stop reason: HOSPADM

## 2025-08-12 RX ORDER — IOPAMIDOL 408 MG/ML
INJECTION, SOLUTION INTRATHECAL PRN
Status: DISCONTINUED | OUTPATIENT
Start: 2025-08-12 | End: 2025-08-12 | Stop reason: HOSPADM

## 2025-08-12 RX ORDER — METHYLPREDNISOLONE ACETATE 40 MG/ML
INJECTION, SUSPENSION INTRA-ARTICULAR; INTRALESIONAL; INTRAMUSCULAR; SOFT TISSUE PRN
Status: DISCONTINUED | OUTPATIENT
Start: 2025-08-12 | End: 2025-08-12 | Stop reason: HOSPADM

## 2025-08-12 RX ORDER — BUPIVACAINE HYDROCHLORIDE 2.5 MG/ML
INJECTION, SOLUTION EPIDURAL; INFILTRATION; INTRACAUDAL; PERINEURAL PRN
Status: DISCONTINUED | OUTPATIENT
Start: 2025-08-12 | End: 2025-08-12 | Stop reason: HOSPADM

## (undated) DEVICE — SYR 07ML EPIDURAL LOSS OF RESISTANCE PULSATOR 4905

## (undated) DEVICE — PREP CHLORAPREP W/ORANGE TINT 10.5ML 260715

## (undated) DEVICE — GLOVE BIOGEL PI ULTRATOUCH G SZ 7.0 42170

## (undated) DEVICE — TRAY PAIN INJECTION 97A 640

## (undated) DEVICE — NDL 18G X 3.5IN TUOHY NDL 322166

## (undated) DEVICE — GLOVE PROTEXIS MICRO 7.0 LT BLUE 2D73PM70

## (undated) DEVICE — NDL EPIDURAL TUOHY 20GA 3.5" 405028